# Patient Record
Sex: MALE | Race: WHITE | NOT HISPANIC OR LATINO
[De-identification: names, ages, dates, MRNs, and addresses within clinical notes are randomized per-mention and may not be internally consistent; named-entity substitution may affect disease eponyms.]

---

## 2017-08-16 ENCOUNTER — APPOINTMENT (OUTPATIENT)
Dept: SURGERY | Facility: CLINIC | Age: 77
End: 2017-08-16

## 2017-08-22 ENCOUNTER — APPOINTMENT (OUTPATIENT)
Dept: PULMONOLOGY | Facility: CLINIC | Age: 77
End: 2017-08-22
Payer: MEDICARE

## 2017-08-22 VITALS
WEIGHT: 180 LBS | OXYGEN SATURATION: 97 % | HEART RATE: 73 BPM | DIASTOLIC BLOOD PRESSURE: 90 MMHG | TEMPERATURE: 98.3 F | BODY MASS INDEX: 26.66 KG/M2 | SYSTOLIC BLOOD PRESSURE: 138 MMHG | HEIGHT: 69 IN

## 2017-08-22 PROCEDURE — 99214 OFFICE O/P EST MOD 30 MIN: CPT | Mod: 25

## 2017-08-22 PROCEDURE — 94010 BREATHING CAPACITY TEST: CPT

## 2018-03-20 ENCOUNTER — APPOINTMENT (OUTPATIENT)
Dept: PULMONOLOGY | Facility: CLINIC | Age: 78
End: 2018-03-20
Payer: MEDICARE

## 2018-03-20 VITALS
SYSTOLIC BLOOD PRESSURE: 142 MMHG | DIASTOLIC BLOOD PRESSURE: 80 MMHG | OXYGEN SATURATION: 97 % | BODY MASS INDEX: 26.66 KG/M2 | HEIGHT: 69 IN | WEIGHT: 180 LBS | HEART RATE: 72 BPM | TEMPERATURE: 98 F

## 2018-03-20 PROCEDURE — 94010 BREATHING CAPACITY TEST: CPT

## 2018-03-20 PROCEDURE — 99214 OFFICE O/P EST MOD 30 MIN: CPT | Mod: 25

## 2018-03-20 PROCEDURE — 71046 X-RAY EXAM CHEST 2 VIEWS: CPT

## 2018-12-03 ENCOUNTER — APPOINTMENT (OUTPATIENT)
Dept: CARDIOTHORACIC SURGERY | Facility: CLINIC | Age: 78
End: 2018-12-03
Payer: MEDICARE

## 2018-12-03 VITALS
TEMPERATURE: 97.4 F | BODY MASS INDEX: 23.4 KG/M2 | RESPIRATION RATE: 18 BRPM | HEIGHT: 69 IN | HEART RATE: 64 BPM | SYSTOLIC BLOOD PRESSURE: 155 MMHG | WEIGHT: 158 LBS | DIASTOLIC BLOOD PRESSURE: 83 MMHG | OXYGEN SATURATION: 97 %

## 2018-12-03 PROCEDURE — 99204 OFFICE O/P NEW MOD 45 MIN: CPT

## 2018-12-05 NOTE — PHYSICAL EXAM
[General Appearance - In No Acute Distress] : no acute distress [Normal Conjunctiva] : the conjunctiva exhibited no abnormalities [Normal Oral Mucosa] : normal oral mucosa [Normal Jugular Venous A Waves Present] : normal jugular venous A waves present [Normal Jugular Venous V Waves Present] : normal jugular venous V waves present [Heart Rate And Rhythm] : heart rate and rhythm were normal [Heart Sounds] : normal S1 and S2 [Edema] : no peripheral edema present [Respiration, Rhythm And Depth] : normal respiratory rhythm and effort [Exaggerated Use Of Accessory Muscles For Inspiration] : no accessory muscle use [Auscultation Breath Sounds / Voice Sounds] : lungs were clear to auscultation bilaterally [Bowel Sounds] : normal bowel sounds [Abdomen Soft] : soft [Abdomen Tenderness] : non-tender [Abnormal Walk] : normal gait [Cyanosis, Localized] : no localized cyanosis [] : no rash [Oriented To Time, Place, And Person] : oriented to person, place, and time [FreeTextEntry1] : + III/VI systolic ejection murmur

## 2018-12-05 NOTE — HISTORY OF PRESENT ILLNESS
[FreeTextEntry1] : 78 year old male with a history of hypertension, hyperlipidemia, GERD, Chronic diastolic heart failure with severe aortic stenosis who was referred for further evaluation of his valvular disease. \par \par The patient states he feels great without any complaints and leads an active lifestyle.  He plays tennis multiple times a week without limitations and does slow jogging for up to half a mile a day. He denies any chest pain, SOB at rest or with exertion, palpitations, dizziness, syncope or LE edema. \par \par The patient had an outpatient ECHO with Dr. Baeza on 11/13/18 which showed severe aortic stenosis with mean gradient 47.6 mmHg. \par \par The patient lives with his wife in an apartment and remains independent in all his ADLs.

## 2019-03-18 ENCOUNTER — APPOINTMENT (OUTPATIENT)
Dept: PULMONOLOGY | Facility: CLINIC | Age: 79
End: 2019-03-18
Payer: MEDICARE

## 2019-03-18 VITALS
HEART RATE: 58 BPM | WEIGHT: 160 LBS | TEMPERATURE: 97 F | SYSTOLIC BLOOD PRESSURE: 124 MMHG | DIASTOLIC BLOOD PRESSURE: 82 MMHG | BODY MASS INDEX: 23.7 KG/M2 | HEIGHT: 69 IN | OXYGEN SATURATION: 99 %

## 2019-03-18 PROCEDURE — 94010 BREATHING CAPACITY TEST: CPT

## 2019-03-18 PROCEDURE — 71046 X-RAY EXAM CHEST 2 VIEWS: CPT

## 2019-03-18 PROCEDURE — 99214 OFFICE O/P EST MOD 30 MIN: CPT | Mod: 25

## 2019-03-18 NOTE — DISCUSSION/SUMMARY
[FreeTextEntry1] : he certainly does not behave like a person with ciritical AS\par \par would suggest repeat echo in different lab to see if the results of concordant with dr. Baeza's\par

## 2019-03-18 NOTE — PHYSICAL EXAM
[General Appearance - Well Developed] : well developed [Normal Appearance] : normal appearance [Well Groomed] : well groomed [General Appearance - Well Nourished] : well nourished [No Deformities] : no deformities [General Appearance - In No Acute Distress] : no acute distress [Normal Conjunctiva] : the conjunctiva exhibited no abnormalities [Eyelids - No Xanthelasma] : the eyelids demonstrated no xanthelasmas [Normal Oropharynx] : normal oropharynx [Neck Appearance] : the appearance of the neck was normal [Neck Cervical Mass (___cm)] : no neck mass was observed [Jugular Venous Distention Increased] : there was no jugular-venous distention [Thyroid Diffuse Enlargement] : the thyroid was not enlarged [Thyroid Nodule] : there were no palpable thyroid nodules [Heart Rate And Rhythm] : heart rate and rhythm were normal [Heart Sounds] : normal S1 and S2 [Respiration, Rhythm And Depth] : normal respiratory rhythm and effort [Exaggerated Use Of Accessory Muscles For Inspiration] : no accessory muscle use [Auscultation Breath Sounds / Voice Sounds] : lungs were clear to auscultation bilaterally [Gait - Sufficient For Exercise Testing] : the gait was sufficient for exercise testing [Abnormal Walk] : normal gait [Nail Clubbing] : no clubbing of the fingernails [Cyanosis, Localized] : no localized cyanosis [Petechial Hemorrhages (___cm)] : no petechial hemorrhages [] : no ischemic changes [FreeTextEntry1] : hypochondria

## 2019-03-18 NOTE — PHYSICAL EXAM
[General Appearance - Well Developed] : well developed [Normal Appearance] : normal appearance [Well Groomed] : well groomed [General Appearance - Well Nourished] : well nourished [No Deformities] : no deformities [General Appearance - In No Acute Distress] : no acute distress [Normal Conjunctiva] : the conjunctiva exhibited no abnormalities [Eyelids - No Xanthelasma] : the eyelids demonstrated no xanthelasmas [Normal Oropharynx] : normal oropharynx [Neck Appearance] : the appearance of the neck was normal [Neck Cervical Mass (___cm)] : no neck mass was observed [Jugular Venous Distention Increased] : there was no jugular-venous distention [Thyroid Diffuse Enlargement] : the thyroid was not enlarged [Thyroid Nodule] : there were no palpable thyroid nodules [Heart Rate And Rhythm] : heart rate and rhythm were normal [Heart Sounds] : normal S1 and S2 [Respiration, Rhythm And Depth] : normal respiratory rhythm and effort [Exaggerated Use Of Accessory Muscles For Inspiration] : no accessory muscle use [Auscultation Breath Sounds / Voice Sounds] : lungs were clear to auscultation bilaterally [Abnormal Walk] : normal gait [Gait - Sufficient For Exercise Testing] : the gait was sufficient for exercise testing [Nail Clubbing] : no clubbing of the fingernails [Cyanosis, Localized] : no localized cyanosis [Petechial Hemorrhages (___cm)] : no petechial hemorrhages [] : no ischemic changes [FreeTextEntry1] : hypochondria

## 2019-03-18 NOTE — HISTORY OF PRESENT ILLNESS
[FreeTextEntry1] : 79 year old man who looks 15 years younger who is basically quite healthy,  i've seen him for ordinary resp infection that he was concerned about , but were just needing more time.  now told of severa AS according to echo, but he has totally asymptomatc, and it is odd that the gradient that was measured was as high as it was found to be.  he is able to play vigorous tennis with no issues.\par \par saw dr. adams who did not repeat the echo but said that no intervention was required.

## 2019-09-27 ENCOUNTER — APPOINTMENT (OUTPATIENT)
Dept: PULMONOLOGY | Facility: CLINIC | Age: 79
End: 2019-09-27
Payer: MEDICARE

## 2019-09-27 VITALS
DIASTOLIC BLOOD PRESSURE: 80 MMHG | BODY MASS INDEX: 23.7 KG/M2 | HEART RATE: 60 BPM | WEIGHT: 160 LBS | TEMPERATURE: 97.1 F | SYSTOLIC BLOOD PRESSURE: 100 MMHG | OXYGEN SATURATION: 97 % | HEIGHT: 69 IN

## 2019-09-27 PROCEDURE — 71046 X-RAY EXAM CHEST 2 VIEWS: CPT

## 2019-09-27 PROCEDURE — 99214 OFFICE O/P EST MOD 30 MIN: CPT | Mod: 25

## 2019-09-27 PROCEDURE — 94010 BREATHING CAPACITY TEST: CPT

## 2019-09-27 PROCEDURE — 95012 NITRIC OXIDE EXP GAS DETER: CPT

## 2019-09-28 NOTE — HISTORY OF PRESENT ILLNESS
[FreeTextEntry1] : status post tavr and has a protracted cough, and also on crestor because of a 50% blockage of the lad,  interestingly he never had much in the say of symptoms.  is on plavix   cougning for the past few months.  ther was a bit of blood in it it,  He has a histoiry of reflux but only take ranitidine intermittently.  this is his likely reason for coughing if I fail to find an althernativer reason

## 2019-09-28 NOTE — PHYSICAL EXAM
[General Appearance - Well Developed] : well developed [Normal Appearance] : normal appearance [Well Groomed] : well groomed [General Appearance - Well Nourished] : well nourished [No Deformities] : no deformities [General Appearance - In No Acute Distress] : no acute distress [Normal Conjunctiva] : the conjunctiva exhibited no abnormalities [Eyelids - No Xanthelasma] : the eyelids demonstrated no xanthelasmas [Normal Oropharynx] : normal oropharynx [Neck Appearance] : the appearance of the neck was normal [Neck Cervical Mass (___cm)] : no neck mass was observed [Jugular Venous Distention Increased] : there was no jugular-venous distention [Thyroid Diffuse Enlargement] : the thyroid was not enlarged [Heart Rate And Rhythm] : heart rate and rhythm were normal [Thyroid Nodule] : there were no palpable thyroid nodules [Heart Sounds] : normal S1 and S2 [Respiration, Rhythm And Depth] : normal respiratory rhythm and effort [Exaggerated Use Of Accessory Muscles For Inspiration] : no accessory muscle use [Auscultation Breath Sounds / Voice Sounds] : lungs were clear to auscultation bilaterally [Abnormal Walk] : normal gait [Gait - Sufficient For Exercise Testing] : the gait was sufficient for exercise testing [Cyanosis, Localized] : no localized cyanosis [Nail Clubbing] : no clubbing of the fingernails [] : no ischemic changes [Petechial Hemorrhages (___cm)] : no petechial hemorrhages [FreeTextEntry1] : hypochondria

## 2019-09-28 NOTE — DISCUSSION/SUMMARY
[FreeTextEntry1] : rule out reflux related cough\par \par return to ranitidine twice a day\par \par

## 2019-09-28 NOTE — REVIEW OF SYSTEMS
[Cough] : cough [Sputum] : sputum  [Anxiety] : anxiety [Dyspnea] : no dyspnea [Negative] : Sleep Disorder

## 2020-01-23 ENCOUNTER — APPOINTMENT (OUTPATIENT)
Dept: UROLOGY | Facility: CLINIC | Age: 80
End: 2020-01-23
Payer: MEDICARE

## 2020-01-23 VITALS — DIASTOLIC BLOOD PRESSURE: 97 MMHG | TEMPERATURE: 97.3 F | SYSTOLIC BLOOD PRESSURE: 152 MMHG | HEART RATE: 71 BPM

## 2020-01-23 PROCEDURE — 99204 OFFICE O/P NEW MOD 45 MIN: CPT

## 2020-01-27 LAB
APPEARANCE: CLEAR
BACTERIA UR CULT: NORMAL
BACTERIA: NEGATIVE
BILIRUBIN URINE: NEGATIVE
BLOOD URINE: ABNORMAL
COLOR: NORMAL
GLUCOSE QUALITATIVE U: NEGATIVE
HYALINE CASTS: 1 /LPF
KETONES URINE: NEGATIVE
LEUKOCYTE ESTERASE URINE: NEGATIVE
MICROSCOPIC-UA: NORMAL
NITRITE URINE: NEGATIVE
PH URINE: 5.5
PROTEIN URINE: NEGATIVE
RED BLOOD CELLS URINE: 4 /HPF
SPECIFIC GRAVITY URINE: 1.01
SQUAMOUS EPITHELIAL CELLS: 0 /HPF
UROBILINOGEN URINE: NORMAL
WHITE BLOOD CELLS URINE: 0 /HPF

## 2020-03-25 ENCOUNTER — APPOINTMENT (OUTPATIENT)
Dept: ULTRASOUND IMAGING | Facility: HOSPITAL | Age: 80
End: 2020-03-25

## 2020-03-31 ENCOUNTER — APPOINTMENT (OUTPATIENT)
Dept: UROLOGY | Facility: CLINIC | Age: 80
End: 2020-03-31

## 2020-04-24 ENCOUNTER — APPOINTMENT (OUTPATIENT)
Dept: UROLOGY | Facility: CLINIC | Age: 80
End: 2020-04-24

## 2020-09-21 DIAGNOSIS — N52.01 ERECTILE DYSFUNCTION DUE TO ARTERIAL INSUFFICIENCY: ICD-10-CM

## 2020-09-29 ENCOUNTER — APPOINTMENT (OUTPATIENT)
Dept: UROLOGY | Facility: CLINIC | Age: 80
End: 2020-09-29
Payer: MEDICARE

## 2020-09-29 VITALS — HEART RATE: 61 BPM | SYSTOLIC BLOOD PRESSURE: 143 MMHG | DIASTOLIC BLOOD PRESSURE: 88 MMHG

## 2020-09-29 DIAGNOSIS — N50.89 OTHER SPECIFIED DISORDERS OF THE MALE GENITAL ORGANS: ICD-10-CM

## 2020-09-29 DIAGNOSIS — N40.0 BENIGN PROSTATIC HYPERPLASIA WITHOUT LOWER URINARY TRACT SYMPMS: ICD-10-CM

## 2020-09-29 DIAGNOSIS — R31.21 ASYMPTOMATIC MICROSCOPIC HEMATURIA: ICD-10-CM

## 2020-09-29 PROCEDURE — 99214 OFFICE O/P EST MOD 30 MIN: CPT | Mod: 25

## 2020-09-29 PROCEDURE — 76705 ECHO EXAM OF ABDOMEN: CPT

## 2020-10-01 ENCOUNTER — APPOINTMENT (OUTPATIENT)
Dept: ULTRASOUND IMAGING | Facility: HOSPITAL | Age: 80
End: 2020-10-01

## 2020-10-01 LAB
APPEARANCE: CLEAR
BACTERIA UR CULT: NORMAL
BACTERIA: NEGATIVE
BILIRUBIN URINE: NEGATIVE
BLOOD URINE: NEGATIVE
COLOR: NORMAL
GLUCOSE QUALITATIVE U: NEGATIVE
HYALINE CASTS: 0 /LPF
KETONES URINE: NEGATIVE
LEUKOCYTE ESTERASE URINE: NEGATIVE
MICROSCOPIC-UA: NORMAL
NITRITE URINE: NEGATIVE
PH URINE: 6.5
PROTEIN URINE: NEGATIVE
RED BLOOD CELLS URINE: 0 /HPF
SPECIFIC GRAVITY URINE: 1.01
SQUAMOUS EPITHELIAL CELLS: 0 /HPF
UROBILINOGEN URINE: NORMAL
WHITE BLOOD CELLS URINE: 0 /HPF

## 2020-10-01 NOTE — HISTORY OF PRESENT ILLNESS
[FreeTextEntry1] : Dear Dr. Jun Kothari,\par Dear Dr. Georgia Baeza (PCP)\par \par Thank you so much for the referral to help care for your patient.\par \par Chief Complaint: AMH, BPH, ED annual follow up\par Date of first visit: 09/29/2020\par \par SHARON OROZCO  is a 80 year old  man, hx of BPH, AMH and ED, who presents for an annual visit. He was seen prior by Dr. Kothari on 01/23/2020. He was asked to follow up with Renal US, cystoscopy, Penile Doppler for ED. He is on Coumadin for Aortic Heart Valve.\par \par He had a prior work up in 2008, 2010 with CT scan, that was negative. Also he had Hematologist evaluate him. \par \par No prior gross hematuria. No CT Urogram scan not done. \par \par He reports generally having stable urinary symptoms with Nocturia x 2.\par \par 09/21/2020 \par IPSS 11 QOL 3 \par ELINA 2\par \par The patient denies fevers, chills, nausea and or vomiting and no unexplained weight loss.\par \par All pertinent laboratory, films and physician notes were reviewed.  Questionnaire results were discussed with patient.

## 2020-10-01 NOTE — ASSESSMENT
[FreeTextEntry1] : Work up for microhematuria, on Coumadin\par \par 1. UA UCX today\par 2. Renal US study today small renal cyst with a sepatation - recommend CT renal mass eval\par 3. Cystoscopy to complete work up\par 4. Scrotal Testicular US today or other day\par \par Thank you very much for allowing me to assist in the care of this patient. Should you have any additional questions or concerns please do not hesitate to contact me.\par \par Sincerely,\par \par Fernando Waite D.O.\par  of Urology and Radiology\par  of Urology at [facility]\par System Director for Prostate Cancer\par [address], [address]\par Phone: [phone]\par

## 2020-10-01 NOTE — PHYSICAL EXAM
[Urethral Meatus] : meatus normal [Penis Abnormality] : normal circumcised penis [Scrotum] : the scrotum was normal [Epididymis] : the epididymides were normal [Testes Tenderness] : no tenderness of the testes [Prostate Tenderness] : the prostate was not tender [No Prostate Nodules] : no prostate nodules [Prostate Size ___ gm] : prostate size [unfilled] gm [FreeTextEntry1] : Left scrotal cyst, likely hydrocele or epididymal head cyst

## 2021-03-01 ENCOUNTER — APPOINTMENT (OUTPATIENT)
Dept: PULMONOLOGY | Facility: CLINIC | Age: 81
End: 2021-03-01
Payer: MEDICARE

## 2021-03-01 VITALS
TEMPERATURE: 97.9 F | SYSTOLIC BLOOD PRESSURE: 124 MMHG | WEIGHT: 178 LBS | BODY MASS INDEX: 26.36 KG/M2 | DIASTOLIC BLOOD PRESSURE: 83 MMHG | HEIGHT: 69 IN | OXYGEN SATURATION: 97 % | HEART RATE: 63 BPM

## 2021-03-01 PROCEDURE — 94010 BREATHING CAPACITY TEST: CPT

## 2021-03-01 PROCEDURE — 99214 OFFICE O/P EST MOD 30 MIN: CPT | Mod: 25

## 2021-03-01 PROCEDURE — 95012 NITRIC OXIDE EXP GAS DETER: CPT

## 2021-03-02 NOTE — REVIEW OF SYSTEMS
[Cough] : cough [Chest Tightness] : no chest tightness [Sputum] : no sputum [Dyspnea] : no dyspnea [Negative] : Endocrine

## 2021-03-02 NOTE — HISTORY OF PRESENT ILLNESS
[TextBox_4] : 81 years of age, feels well, saw karie jones- told him had reflux.  cough not improved with reflux diagnosis and treatment\par here with exhaustive history, much not relevant to the problem at hand\par had aortic valve replacement, being followed at [facility] for this.\par \par his cough which in the past seemeed intermittedn is now chronic and non productive,  doees appear upper airway\par \par has qualitied of neurogenic cough, talking laughing blowing on the spirometry seems to induce it\par \par but cold makes it better?\par \par

## 2021-03-02 NOTE — DISCUSSION/SUMMARY
[FreeTextEntry1] : he mostly wants to be sure that the cough does not indicate something serious\par \par however I spoke to him at length about the diferential diagnosis of cough\par \par and what a formal work up would consist up.\par \par i explained ot him that to know that the cough does not indicate a serious condition that a ct of the chest would be needed.

## 2021-04-01 DIAGNOSIS — Z87.09 PERSONAL HISTORY OF OTHER DISEASES OF THE RESPIRATORY SYSTEM: ICD-10-CM

## 2021-04-01 DIAGNOSIS — H92.20 OTORRHAGIA, UNSPECIFIED EAR: ICD-10-CM

## 2021-04-01 DIAGNOSIS — J02.9 ACUTE PHARYNGITIS, UNSPECIFIED: ICD-10-CM

## 2021-04-01 DIAGNOSIS — R09.89 OTHER FORMS OF DYSPNEA: ICD-10-CM

## 2021-04-01 DIAGNOSIS — Z86.69 PERSONAL HISTORY OF OTHER DISEASES OF THE NERVOUS SYSTEM AND SENSE ORGANS: ICD-10-CM

## 2021-04-01 DIAGNOSIS — R06.09 OTHER FORMS OF DYSPNEA: ICD-10-CM

## 2021-04-01 DIAGNOSIS — F41.9 ANXIETY DISORDER, UNSPECIFIED: ICD-10-CM

## 2021-04-05 ENCOUNTER — NON-APPOINTMENT (OUTPATIENT)
Age: 81
End: 2021-04-05

## 2021-04-06 ENCOUNTER — APPOINTMENT (OUTPATIENT)
Dept: HEART AND VASCULAR | Facility: CLINIC | Age: 81
End: 2021-04-06
Payer: MEDICARE

## 2021-04-06 ENCOUNTER — NON-APPOINTMENT (OUTPATIENT)
Age: 81
End: 2021-04-06

## 2021-04-06 ENCOUNTER — LABORATORY RESULT (OUTPATIENT)
Age: 81
End: 2021-04-06

## 2021-04-06 VITALS
HEIGHT: 69 IN | BODY MASS INDEX: 26.36 KG/M2 | HEART RATE: 68 BPM | TEMPERATURE: 98 F | DIASTOLIC BLOOD PRESSURE: 70 MMHG | WEIGHT: 178 LBS | SYSTOLIC BLOOD PRESSURE: 130 MMHG

## 2021-04-06 DIAGNOSIS — Z00.00 ENCOUNTER FOR GENERAL ADULT MEDICAL EXAMINATION W/OUT ABNORMAL FINDINGS: ICD-10-CM

## 2021-04-06 PROCEDURE — 99214 OFFICE O/P EST MOD 30 MIN: CPT | Mod: 25

## 2021-04-06 PROCEDURE — 93000 ELECTROCARDIOGRAM COMPLETE: CPT

## 2021-04-06 PROCEDURE — 36415 COLL VENOUS BLD VENIPUNCTURE: CPT

## 2021-04-07 LAB
ALBUMIN SERPL ELPH-MCNC: 4.4 G/DL
ALP BLD-CCNC: 53 U/L
ALT SERPL-CCNC: 16 U/L
ANION GAP SERPL CALC-SCNC: 13 MMOL/L
AST SERPL-CCNC: 24 U/L
BASOPHILS # BLD AUTO: 0.04 K/UL
BASOPHILS NFR BLD AUTO: 0.8 %
BILIRUB SERPL-MCNC: 0.6 MG/DL
BUN SERPL-MCNC: 28 MG/DL
CALCIUM SERPL-MCNC: 9.5 MG/DL
CHLORIDE SERPL-SCNC: 104 MMOL/L
CHOLEST SERPL-MCNC: 166 MG/DL
CO2 SERPL-SCNC: 24 MMOL/L
CREAT SERPL-MCNC: 1.52 MG/DL
EOSINOPHIL # BLD AUTO: 0.03 K/UL
EOSINOPHIL NFR BLD AUTO: 0.6 %
ESTIMATED AVERAGE GLUCOSE: 114 MG/DL
GLUCOSE SERPL-MCNC: 90 MG/DL
HBA1C MFR BLD HPLC: 5.6 %
HCT VFR BLD CALC: 44.5 %
HDLC SERPL-MCNC: 71 MG/DL
HGB BLD-MCNC: 14.8 G/DL
IMM GRANULOCYTES NFR BLD AUTO: 0.2 %
INR PPP: 2.52 RATIO
LDLC SERPL CALC-MCNC: 81 MG/DL
LYMPHOCYTES # BLD AUTO: 1.62 K/UL
LYMPHOCYTES NFR BLD AUTO: 30.6 %
MAN DIFF?: NORMAL
MCHC RBC-ENTMCNC: 30.3 PG
MCHC RBC-ENTMCNC: 33.3 GM/DL
MCV RBC AUTO: 91 FL
MONOCYTES # BLD AUTO: 0.44 K/UL
MONOCYTES NFR BLD AUTO: 8.3 %
NEUTROPHILS # BLD AUTO: 3.15 K/UL
NEUTROPHILS NFR BLD AUTO: 59.5 %
NONHDLC SERPL-MCNC: 96 MG/DL
PLATELET # BLD AUTO: 142 K/UL
POTASSIUM SERPL-SCNC: 4.7 MMOL/L
PROT SERPL-MCNC: 6.7 G/DL
PT BLD: 28.7 SEC
RBC # BLD: 4.89 M/UL
RBC # FLD: 13.2 %
SODIUM SERPL-SCNC: 141 MMOL/L
TRIGL SERPL-MCNC: 75 MG/DL
TSH SERPL-ACNC: 5.18 UIU/ML
VIT B12 SERPL-MCNC: 294 PG/ML
WBC # FLD AUTO: 5.29 K/UL

## 2021-04-07 NOTE — ASSESSMENT
[FreeTextEntry1] : Patient with the following problems he had a aortic stenosis and received a TAVR at [facility] a few months later he had a severe gradient that was found due to a clot he has been on anticoagulation since October 2020 and he would like to stop discussed with Dr. Rowe and the doctor at [facility] there is a 50% recurrence rate I suggest seeing a hematologist and doing a coag work-up and then if that is normal stopping the Coumadin and repeating an echo within 1 month

## 2021-04-07 NOTE — HISTORY OF PRESENT ILLNESS
[FreeTextEntry1] : Patient is status post TAVR several months after TAVR I noted that he had a significant aortic valve gradient and a clot was seen and he was placed on anticoagulation

## 2021-04-07 NOTE — PHYSICAL EXAM
[General Appearance - Well Developed] : well developed [Normal Appearance] : normal appearance [Well Groomed] : well groomed [General Appearance - Well Nourished] : well nourished [No Deformities] : no deformities [General Appearance - In No Acute Distress] : no acute distress [Normal Conjunctiva] : the conjunctiva exhibited no abnormalities [Eyelids - No Xanthelasma] : the eyelids demonstrated no xanthelasmas [Normal Oral Mucosa] : normal oral mucosa [No Oral Pallor] : no oral pallor [No Oral Cyanosis] : no oral cyanosis [Normal Jugular Venous A Waves Present] : normal jugular venous A waves present [Normal Jugular Venous V Waves Present] : normal jugular venous V waves present [No Jugular Venous Gee A Waves] : no jugular venous gee A waves [Respiration, Rhythm And Depth] : normal respiratory rhythm and effort [Exaggerated Use Of Accessory Muscles For Inspiration] : no accessory muscle use [Auscultation Breath Sounds / Voice Sounds] : lungs were clear to auscultation bilaterally [Systolic grade ___/6] : A grade [unfilled]/6 systolic murmur was heard. [Abnormal Walk] : normal gait [Gait - Sufficient For Exercise Testing] : the gait was sufficient for exercise testing [Nail Clubbing] : no clubbing of the fingernails [Petechial Hemorrhages (___cm)] : no petechial hemorrhages [Cyanosis, Localized] : no localized cyanosis [] : no ischemic changes

## 2021-05-14 ENCOUNTER — APPOINTMENT (OUTPATIENT)
Dept: PULMONOLOGY | Facility: CLINIC | Age: 81
End: 2021-05-14
Payer: MEDICARE

## 2021-05-14 VITALS
DIASTOLIC BLOOD PRESSURE: 73 MMHG | SYSTOLIC BLOOD PRESSURE: 120 MMHG | OXYGEN SATURATION: 97 % | BODY MASS INDEX: 26.66 KG/M2 | HEIGHT: 69 IN | WEIGHT: 180 LBS | HEART RATE: 68 BPM | TEMPERATURE: 97.1 F

## 2021-05-14 PROCEDURE — 99213 OFFICE O/P EST LOW 20 MIN: CPT

## 2021-05-15 NOTE — HISTORY OF PRESENT ILLNESS
[TextBox_4] : patient with chronic low grade cough.\par \par primary doc got high resolution ct and mild bronchiectasis is seen.\par \par patient is very obsessive about every aspect of his health and we reviewed the ct scan extensively and i told hm that his cough could be explained by this\par \par i reviewed every reason a person can haver bronchiectasis\par \par he posed the question of how can an 85 year old person he knew "just die".\par \par clearly he thinks that being obsessive about his health in some fashion this will help him be healthier and prevent such an occurrence.\par \par reviewed this issues in great depth\par \par

## 2021-06-03 LAB
A1AT SERPL-MCNC: 162 MG/DL
ALBUMIN MFR SERPL ELPH: 59.9 %
ALBUMIN SERPL-MCNC: 4.2 G/DL
ALBUMIN/GLOB SERPL: 1.5 RATIO
ALPHA1 GLOB MFR SERPL ELPH: 4.6 %
ALPHA1 GLOB SERPL ELPH-MCNC: 0.3 G/DL
ALPHA2 GLOB MFR SERPL ELPH: 9.5 %
ALPHA2 GLOB SERPL ELPH-MCNC: 0.7 G/DL
B-GLOBULIN MFR SERPL ELPH: 12.8 %
B-GLOBULIN SERPL ELPH-MCNC: 0.9 G/DL
GAMMA GLOB FLD ELPH-MCNC: 0.9 G/DL
GAMMA GLOB MFR SERPL ELPH: 13.2 %
INTERPRETATION SERPL IEP-IMP: NORMAL
PROT SERPL-MCNC: 7 G/DL
PROT SERPL-MCNC: 7 G/DL

## 2021-06-24 ENCOUNTER — APPOINTMENT (OUTPATIENT)
Dept: HEART AND VASCULAR | Facility: CLINIC | Age: 81
End: 2021-06-24
Payer: MEDICARE

## 2021-06-24 VITALS
TEMPERATURE: 97 F | HEART RATE: 53 BPM | WEIGHT: 180 LBS | DIASTOLIC BLOOD PRESSURE: 83 MMHG | HEIGHT: 69 IN | SYSTOLIC BLOOD PRESSURE: 156 MMHG | BODY MASS INDEX: 26.66 KG/M2

## 2021-06-24 DIAGNOSIS — R91.1 SOLITARY PULMONARY NODULE: ICD-10-CM

## 2021-06-24 PROCEDURE — 99214 OFFICE O/P EST MOD 30 MIN: CPT | Mod: 25

## 2021-06-24 PROCEDURE — 93306 TTE W/DOPPLER COMPLETE: CPT

## 2021-06-29 PROBLEM — R91.1 LUNG NODULE SEEN ON IMAGING STUDY: Status: ACTIVE | Noted: 2021-06-29

## 2021-06-29 NOTE — PHYSICAL EXAM
[Murmur] : murmur [Normal] : no edema, no cyanosis, no clubbing, no varicosities [de-identified] : 2/6 rhea gee

## 2021-06-29 NOTE — ASSESSMENT
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 Rowe there is a 50% chance of rethrombosis patient was seen by Dr. Judson Bah of hematology there is no underlying coagulation issues patient is aware that there is a small chance of a stroke if we go off anticoagulation if we remain on anticoagulation of course there bleeding issues patient did bleed in his ear on probing with a Q-tip and feels that there has been some hearing loss there unclear to me if it was related to bleeding from the outer ear from manual manipulation patient is to think about whether he wants to go off anticoagulation or not

## 2021-06-29 NOTE — HISTORY OF PRESENT ILLNESS
[FreeTextEntry1] : Patient had a TAVR for severe aortic stenosis and shortness of breath post TAVR patient's gradients were climbing and he had moderate to severe aortic stenosis and a clot was found on the valve patient has been anticoagulated for over a year patient is wishes to go off anticoagulation and is here to discuss that issue

## 2021-08-26 ENCOUNTER — APPOINTMENT (OUTPATIENT)
Dept: HEART AND VASCULAR | Facility: CLINIC | Age: 81
End: 2021-08-26
Payer: MEDICARE

## 2021-08-26 VITALS
HEART RATE: 78 BPM | OXYGEN SATURATION: 97 % | BODY MASS INDEX: 25.48 KG/M2 | WEIGHT: 172 LBS | HEIGHT: 69 IN | DIASTOLIC BLOOD PRESSURE: 80 MMHG | SYSTOLIC BLOOD PRESSURE: 126 MMHG | TEMPERATURE: 97.7 F

## 2021-08-26 PROCEDURE — 93306 TTE W/DOPPLER COMPLETE: CPT

## 2021-08-26 PROCEDURE — 99215 OFFICE O/P EST HI 40 MIN: CPT | Mod: 25

## 2021-08-26 NOTE — ASSESSMENT
[FreeTextEntry1] : Patient's gradient has gone up from a peak of 23 24-69 with a mean gradient of 43 patient will restart Coumadin and continue a baby aspirin he will start 5 mg a day and repeat PT and INR on Monday patient had a recent MRI of the brain that showed no infarcts though he is having headaches and is seeing Dr. Brwon for that case discussed again with Dr. Monte from [facility] who placed the aortic valve he agrees with the management he feels no need for repeat CT scan now and as long as the patient is asymptomatic we will anticoagulate and repeat echocardiograms at intervals patient is aware of the above and will get an INR on Monday after 4 days of Coumadin

## 2021-08-26 NOTE — PHYSICAL EXAM
[Murmur] : murmur [Normal] : no edema, no cyanosis, no clubbing, no varicosities [de-identified] : 2/6 rhea gee

## 2021-08-26 NOTE — REASON FOR VISIT
[FreeTextEntry1] : Patient is here for follow-up he stopped his Coumadin approximately 5 weeks ago on July 20 after discussion of the possibility of the valve reclotting with both myself and Dr. Monte at [facility] patient had had a coagulation work-up by Dr. Judson Bah patient is asymptomatic

## 2021-08-27 ENCOUNTER — APPOINTMENT (OUTPATIENT)
Dept: HEART AND VASCULAR | Facility: CLINIC | Age: 81
End: 2021-08-27
Payer: MEDICARE

## 2021-08-27 ENCOUNTER — APPOINTMENT (OUTPATIENT)
Dept: CARDIOTHORACIC SURGERY | Facility: CLINIC | Age: 81
End: 2021-08-27
Payer: MEDICARE

## 2021-08-27 VITALS — HEART RATE: 61 BPM | TEMPERATURE: 98 F

## 2021-08-27 VITALS
DIASTOLIC BLOOD PRESSURE: 77 MMHG | OXYGEN SATURATION: 95 % | HEART RATE: 61 BPM | SYSTOLIC BLOOD PRESSURE: 144 MMHG | WEIGHT: 170 LBS | RESPIRATION RATE: 17 BRPM | TEMPERATURE: 97.2 F | BODY MASS INDEX: 25.18 KG/M2 | HEIGHT: 69 IN

## 2021-08-27 PROCEDURE — 99213 OFFICE O/P EST LOW 20 MIN: CPT

## 2021-08-27 PROCEDURE — 93000 ELECTROCARDIOGRAM COMPLETE: CPT

## 2021-08-27 PROCEDURE — 93000 ELECTROCARDIOGRAM COMPLETE: CPT | Mod: 77

## 2021-08-30 ENCOUNTER — NON-APPOINTMENT (OUTPATIENT)
Age: 81
End: 2021-08-30

## 2021-08-30 ENCOUNTER — APPOINTMENT (OUTPATIENT)
Dept: HEART AND VASCULAR | Facility: CLINIC | Age: 81
End: 2021-08-30
Payer: MEDICARE

## 2021-08-30 VITALS
BODY MASS INDEX: 25.18 KG/M2 | HEIGHT: 69 IN | DIASTOLIC BLOOD PRESSURE: 73 MMHG | SYSTOLIC BLOOD PRESSURE: 143 MMHG | HEART RATE: 59 BPM | WEIGHT: 170 LBS

## 2021-08-30 PROCEDURE — 99204 OFFICE O/P NEW MOD 45 MIN: CPT

## 2021-08-30 PROCEDURE — 93000 ELECTROCARDIOGRAM COMPLETE: CPT

## 2021-08-31 NOTE — HISTORY OF PRESENT ILLNESS
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 \par \par The patient underwent an uncomplicated TAVR in April 2019. In October 2019, the patient was noted to have an elevated aortic valve gradient on ECHO and subsequent evaluation showed leaflet thrombosis. Of note, he was asymptomatic at the time. He was started on Coumadin and the gradient improved. In July, the patient wished to get off the Coumadin. He was sent to Dr. Bah for a hypercoagulable workup that was negative. The patient discontinued Coumadin on July 20th and a repeat ECHO done last week showed an increase in the aortic gradient again with a mean gradient of 43 mmHg. He resumed Coumadin last night. \par \par In general, the patient states he is feeling well. He denies chest pain and SOB, at rest and with exertion. The patient also denies orthopnea, PND, dizziness, syncope, LE edema and palpitations. \par \par The patient lives with his wife in an apartment and remains independent in all his ADLs.

## 2021-08-31 NOTE — DISCUSSION/SUMMARY
[FreeTextEntry1] : Mr. 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 Baeza 8/2021.  He has no high risk symptoms.  I recommended that he undergo ambulatory telemetry for heart rhythm assessment to evaluate for any evidence of worsening conduction disease / AV block.  He was asked to return for follow-up in 6 weeks and knows to call with any questions or concerns in the interim.

## 2021-08-31 NOTE — HISTORY OF PRESENT ILLNESS
[FreeTextEntry1] : Mr. Oliva is an 81 year old male with a past medical history significant for HTN, HLD, GERD, severe AS s/p TAVR (Chepe @ [facility] 4/2019).  In October 2019, the patient was noted to have an elevated aortic valve gradient on ECHO and subsequent evaluation showed leaflet thrombosis. He was started on Coumadin and the gradient improved. In July, the patient wished to get off the Coumadin. He was sent to Dr. Bah for a hypercoagulable workup that was negative. The patient discontinued Coumadin and a repeat ECHO showed an increase in the aortic gradient again with a mean gradient of 43 mmHg. He resumed Coumadin thereafter.  He is referred for EP evaluation of a new RBBB noted on ECG with Dr. Baeza.  He feels well and offers no acute complaints.  He denies any dizziness, presyncope/syncope.  \par \par

## 2021-08-31 NOTE — PHYSICAL EXAM
[General Appearance - In No Acute Distress] : no acute distress [Normal Conjunctiva] : the conjunctiva exhibited no abnormalities [Normal Oral Mucosa] : normal oral mucosa [Normal Jugular Venous A Waves Present] : normal jugular venous A waves present [Normal Jugular Venous V Waves Present] : normal jugular venous V waves present [Heart Rate And Rhythm] : heart rate and rhythm were normal [Edema] : no peripheral edema present [Respiration, Rhythm And Depth] : normal respiratory rhythm and effort [Exaggerated Use Of Accessory Muscles For Inspiration] : no accessory muscle use [Auscultation Breath Sounds / Voice Sounds] : lungs were clear to auscultation bilaterally [Bowel Sounds] : normal bowel sounds [Abdomen Soft] : soft [Abdomen Tenderness] : non-tender [Cyanosis, Localized] : no localized cyanosis [Abnormal Walk] : normal gait [] : no rash [Oriented To Time, Place, And Person] : oriented to person, place, and time [Normal Appearance] : normal appearance [FreeTextEntry1] : Normal S1, diminished S2. RICHELLE best heard at the [facility].

## 2021-09-10 ENCOUNTER — APPOINTMENT (OUTPATIENT)
Dept: UROLOGY | Facility: CLINIC | Age: 81
End: 2021-09-10
Payer: MEDICARE

## 2021-09-10 ENCOUNTER — NON-APPOINTMENT (OUTPATIENT)
Age: 81
End: 2021-09-10

## 2021-09-10 VITALS
WEIGHT: 170 LBS | HEART RATE: 67 BPM | TEMPERATURE: 97.4 F | SYSTOLIC BLOOD PRESSURE: 123 MMHG | HEIGHT: 69 IN | DIASTOLIC BLOOD PRESSURE: 75 MMHG | BODY MASS INDEX: 25.18 KG/M2

## 2021-09-10 DIAGNOSIS — N50.812 LEFT TESTICULAR PAIN: ICD-10-CM

## 2021-09-10 PROCEDURE — 99214 OFFICE O/P EST MOD 30 MIN: CPT | Mod: 25

## 2021-09-10 PROCEDURE — 76870 US EXAM SCROTUM: CPT

## 2021-09-10 PROCEDURE — 93975 VASCULAR STUDY: CPT

## 2021-09-10 NOTE — HISTORY OF PRESENT ILLNESS
[FreeTextEntry1] : Mr. Oliva is here due to left testicular pain\par \par medical history of \par TAVR \par ON WARFARIN\par \par SURGICAL HISTORY OF BILATERAL INGUINAL HERNIA REPAIR\par CT AND CYSTOSCOPY DONE YEARS AGO WERE NORMAL \par PSA normal (as per patient , results not available)

## 2021-09-10 NOTE — PHYSICAL EXAM
[Urethral Meatus] : meatus normal [Penis Abnormality] : normal circumcised penis [Anus Abnormality] : the anus and perineum were normal [Rectal Exam - Rectum] : digital rectal exam was normal [Prostate Tenderness] : the prostate was not tender [No Prostate Nodules] : no prostate nodules [Prostate Enlarged] : was enlarged [Prostate Tenderness] : was not tender [Prostate Fluctuant] : was not fluctuant [FreeTextEntry1] : left testicular tenderness and hotness redness, suggestive of epididymo-orchitis

## 2021-09-10 NOTE — ASSESSMENT
[FreeTextEntry1] : left testis pain\par previous heistoyr microhematuria\par "I want you to focus only on this - the hematuria has been addressed"\par no previous cysto\par sonogram is equivocal for orchitis - unlikley\par large left epdidiymal cyst\par UA UCX today\par will start empiric keflex - if negative culture will stop\par if no infeciton f/u here PRN\par

## 2021-09-10 NOTE — PHYSICAL EXAM
[General Appearance - Well Developed] : well developed [General Appearance - Well Nourished] : well nourished [Normal Appearance] : normal appearance [Well Groomed] : well groomed [Heart Rate And Rhythm] : Heart rate and rhythm were normal [] : no respiratory distress [Abdomen Soft] : soft [Abdomen Tenderness] : non-tender [Abdomen Hernia] : no hernia was discovered [Costovertebral Angle Tenderness] : no ~M costovertebral angle tenderness [Urethral Meatus] : meatus normal [Urinary Bladder Findings] : the bladder was normal on palpation [Scrotum] : the scrotum was normal [Epididymis] : the epididymides were normal [Testes Tenderness] : no tenderness of the testes [Testes Mass (___cm)] : there were no testicular masses [Normal Station and Gait] : the gait and station were normal for the patient's age [Skin Color & Pigmentation] : normal skin color and pigmentation [No Focal Deficits] : no focal deficits [Oriented To Time, Place, And Person] : oriented to person, place, and time [No Palpable Adenopathy] : no palpable adenopathy [Penis Abnormality] : normal circumcised penis [Prostate Tenderness] : the prostate was not tender [No Prostate Nodules] : no prostate nodules

## 2021-09-10 NOTE — ASSESSMENT
[FreeTextEntry1] : UA, UCX\par PSA ahen the epididmoorchitis resolve\par antibiotics \par F/A 2 WEEKS

## 2021-09-10 NOTE — HISTORY OF PRESENT ILLNESS
[FreeTextEntry1] : Mr. Oliva is here for follow up hematuria 2020\par \par \par currently : no gross hematuria\par he has mild frequancy nocturia incomplete emptying\par \par father  of prostate cancer \par Medical history and medication \par \par aortic valve replacement  on warfarin\par Rouvstatin\par hydrochlorothiazide \par Baby Aspirine 81 mg\par HEARING PROBLEMS\par \par SURGICAL HISTORY \par Bilateral inguinal hernias repair \par TAVR \par \par HISTORY OF CYSTOSCOPY AND CT done many years and were normal \par \par left hemiscrotum is red , hot and tender suggestive of epididymo-orchitis?\par CHERRI : Enlarged prostate , benign , no nodules\par

## 2021-09-10 NOTE — PHYSICAL EXAM
[General Appearance - Well Developed] : well developed [General Appearance - Well Nourished] : well nourished [Normal Appearance] : normal appearance [Well Groomed] : well groomed [] : no respiratory distress [Heart Rate And Rhythm] : Heart rate and rhythm were normal [Abdomen Soft] : soft [Abdomen Hernia] : no hernia was discovered [Abdomen Tenderness] : non-tender [Costovertebral Angle Tenderness] : no ~M costovertebral angle tenderness [Urethral Meatus] : meatus normal [Urinary Bladder Findings] : the bladder was normal on palpation [Scrotum] : the scrotum was normal [Epididymis] : the epididymides were normal [Testes Tenderness] : no tenderness of the testes [Testes Mass (___cm)] : there were no testicular masses [Normal Station and Gait] : the gait and station were normal for the patient's age [Skin Color & Pigmentation] : normal skin color and pigmentation [No Focal Deficits] : no focal deficits [Oriented To Time, Place, And Person] : oriented to person, place, and time [No Palpable Adenopathy] : no palpable adenopathy [Penis Abnormality] : normal circumcised penis [Prostate Tenderness] : the prostate was not tender [No Prostate Nodules] : no prostate nodules

## 2021-09-13 LAB
APPEARANCE: CLEAR
BACTERIA UR CULT: NORMAL
BACTERIA: NEGATIVE
BILIRUBIN URINE: NEGATIVE
BLOOD URINE: NORMAL
COLOR: NORMAL
GLUCOSE QUALITATIVE U: NEGATIVE
HYALINE CASTS: 0 /LPF
KETONES URINE: NEGATIVE
LEUKOCYTE ESTERASE URINE: NEGATIVE
MICROSCOPIC-UA: NORMAL
NITRITE URINE: NEGATIVE
PH URINE: 6
PROTEIN URINE: NEGATIVE
RED BLOOD CELLS URINE: 1 /HPF
SPECIFIC GRAVITY URINE: 1.01
SQUAMOUS EPITHELIAL CELLS: 0 /HPF
UROBILINOGEN URINE: NORMAL
WHITE BLOOD CELLS URINE: 0 /HPF

## 2021-09-28 ENCOUNTER — APPOINTMENT (OUTPATIENT)
Dept: HEART AND VASCULAR | Facility: CLINIC | Age: 81
End: 2021-09-28
Payer: MEDICARE

## 2021-09-28 VITALS
BODY MASS INDEX: 25.18 KG/M2 | DIASTOLIC BLOOD PRESSURE: 80 MMHG | SYSTOLIC BLOOD PRESSURE: 128 MMHG | TEMPERATURE: 97.4 F | HEIGHT: 69 IN | HEART RATE: 57 BPM | WEIGHT: 170 LBS

## 2021-09-28 PROCEDURE — 99214 OFFICE O/P EST MOD 30 MIN: CPT | Mod: 25

## 2021-09-28 PROCEDURE — 36415 COLL VENOUS BLD VENIPUNCTURE: CPT

## 2021-09-28 PROCEDURE — 93306 TTE W/DOPPLER COMPLETE: CPT

## 2021-09-28 PROCEDURE — 93000 ELECTROCARDIOGRAM COMPLETE: CPT

## 2021-09-29 NOTE — HISTORY OF PRESENT ILLNESS
[FreeTextEntry1] :  Patient with history of TAVR patient's TAVR clotted off after 4 months patient was on anticoagulation for over a year patient wanted a trial of Coumadin and he reclotted with a peak gradient of 75 patient is here for follow-up patient has been anticoagulated for 6 weeks

## 2021-09-29 NOTE — ASSESSMENT
[FreeTextEntry1] : Patient's peak gradient has decreased from 75-30 patient remains asymptomatic patient has developed a right bundle branch block which is stable he is wearing a Zio patch and is being evaluated by EPS

## 2021-09-29 NOTE — PHYSICAL EXAM
[Murmur] : murmur [Normal] : no edema, no cyanosis, no clubbing, no varicosities [de-identified] : 2/6 rhea gee

## 2021-09-30 LAB
INR PPP: 3.28 RATIO
PT BLD: 36.6 SEC

## 2021-10-03 ENCOUNTER — APPOINTMENT (OUTPATIENT)
Dept: HEART AND VASCULAR | Facility: CLINIC | Age: 81
End: 2021-10-03
Payer: MEDICARE

## 2021-10-03 PROCEDURE — 93248 EXT ECG>7D<15D REV&INTERPJ: CPT

## 2021-11-09 ENCOUNTER — APPOINTMENT (OUTPATIENT)
Dept: HEART AND VASCULAR | Facility: CLINIC | Age: 81
End: 2021-11-09
Payer: MEDICARE

## 2021-11-09 ENCOUNTER — NON-APPOINTMENT (OUTPATIENT)
Age: 81
End: 2021-11-09

## 2021-11-09 VITALS
SYSTOLIC BLOOD PRESSURE: 130 MMHG | BODY MASS INDEX: 25.33 KG/M2 | HEIGHT: 69 IN | HEART RATE: 59 BPM | OXYGEN SATURATION: 97 % | DIASTOLIC BLOOD PRESSURE: 75 MMHG | TEMPERATURE: 97.5 F | WEIGHT: 171 LBS

## 2021-11-09 PROCEDURE — 99213 OFFICE O/P EST LOW 20 MIN: CPT | Mod: 25

## 2021-11-09 PROCEDURE — 93000 ELECTROCARDIOGRAM COMPLETE: CPT

## 2021-11-09 NOTE — PHYSICAL EXAM
[Murmur] : murmur [Normal] : no edema, no cyanosis, no clubbing, no varicosities [de-identified] : 2/6 rhea gee

## 2021-11-09 NOTE — ASSESSMENT
[FreeTextEntry1] : pt without new symptosmwill repeat echocardiogram in one month pt rbbb has not changed pt seen by dr jorge for testicular pain

## 2021-11-09 NOTE — REASON FOR VISIT
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

## 2021-12-07 ENCOUNTER — APPOINTMENT (OUTPATIENT)
Dept: HEART AND VASCULAR | Facility: CLINIC | Age: 81
End: 2021-12-07
Payer: MEDICARE

## 2021-12-07 ENCOUNTER — NON-APPOINTMENT (OUTPATIENT)
Age: 81
End: 2021-12-07

## 2021-12-07 VITALS
HEIGHT: 69 IN | TEMPERATURE: 97.6 F | DIASTOLIC BLOOD PRESSURE: 81 MMHG | WEIGHT: 172 LBS | BODY MASS INDEX: 25.48 KG/M2 | OXYGEN SATURATION: 96 % | SYSTOLIC BLOOD PRESSURE: 135 MMHG | HEART RATE: 55 BPM

## 2021-12-07 PROCEDURE — 93306 TTE W/DOPPLER COMPLETE: CPT

## 2021-12-07 PROCEDURE — 99213 OFFICE O/P EST LOW 20 MIN: CPT | Mod: 25

## 2021-12-07 PROCEDURE — 93000 ELECTROCARDIOGRAM COMPLETE: CPT

## 2021-12-09 NOTE — PHYSICAL EXAM
[Murmur] : murmur [Normal] : no edema, no cyanosis, no clubbing, no varicosities [de-identified] : 2/6 rhea gee

## 2021-12-09 NOTE — ASSESSMENT
[FreeTextEntry1] : gradient decreasing continue life long anticagulation pt has persistant rbbb appreciate eps input

## 2021-12-14 ENCOUNTER — RX RENEWAL (OUTPATIENT)
Age: 81
End: 2021-12-14

## 2022-03-17 ENCOUNTER — NON-APPOINTMENT (OUTPATIENT)
Age: 82
End: 2022-03-17

## 2022-03-17 ENCOUNTER — APPOINTMENT (OUTPATIENT)
Dept: HEART AND VASCULAR | Facility: CLINIC | Age: 82
End: 2022-03-17
Payer: MEDICARE

## 2022-03-17 VITALS
HEART RATE: 69 BPM | HEIGHT: 69 IN | DIASTOLIC BLOOD PRESSURE: 92 MMHG | WEIGHT: 172 LBS | TEMPERATURE: 97.3 F | OXYGEN SATURATION: 95 % | SYSTOLIC BLOOD PRESSURE: 133 MMHG | BODY MASS INDEX: 25.48 KG/M2

## 2022-03-17 PROCEDURE — 36415 COLL VENOUS BLD VENIPUNCTURE: CPT

## 2022-03-17 PROCEDURE — 99214 OFFICE O/P EST MOD 30 MIN: CPT | Mod: 25

## 2022-03-17 NOTE — ASSESSMENT
[FreeTextEntry1] : Patient status post TAVR acceptable gradient INRs have been therapeutic patient has known coronary disease in the LAD patient has a pancreatic cyst that was followed from 2008 through 2016 it is a simple cyst of approximately 2 cm patient had small lung lesions that have resolved on the last CAT scan of April 2021 patient has hypertension and hyperlipidemia

## 2022-03-17 NOTE — PHYSICAL EXAM
[Murmur] : murmur [Normal] : no edema, no cyanosis, no clubbing, no varicosities [de-identified] : 2/6 rhea gee

## 2022-03-18 LAB
ALBUMIN SERPL ELPH-MCNC: 4.3 G/DL
ALP BLD-CCNC: 52 U/L
ALT SERPL-CCNC: 18 U/L
ANION GAP SERPL CALC-SCNC: 14 MMOL/L
APTT BLD: 39 SEC
AST SERPL-CCNC: 33 U/L
BASOPHILS # BLD AUTO: 0.04 K/UL
BASOPHILS NFR BLD AUTO: 0.7 %
BILIRUB SERPL-MCNC: 0.8 MG/DL
BUN SERPL-MCNC: 25 MG/DL
CALCIUM SERPL-MCNC: 9.7 MG/DL
CHLORIDE SERPL-SCNC: 101 MMOL/L
CHOLEST SERPL-MCNC: 161 MG/DL
CK SERPL-CCNC: 374 U/L
CO2 SERPL-SCNC: 24 MMOL/L
CREAT SERPL-MCNC: 1.45 MG/DL
EGFR: 48 ML/MIN/1.73M2
EOSINOPHIL # BLD AUTO: 0.03 K/UL
EOSINOPHIL NFR BLD AUTO: 0.6 %
ESTIMATED AVERAGE GLUCOSE: 117 MG/DL
FERRITIN SERPL-MCNC: 99 NG/ML
GLUCOSE SERPL-MCNC: 94 MG/DL
HBA1C MFR BLD HPLC: 5.7 %
HCT VFR BLD CALC: 43.8 %
HDLC SERPL-MCNC: 78 MG/DL
HGB BLD-MCNC: 14.2 G/DL
IMM GRANULOCYTES NFR BLD AUTO: 0.2 %
INR PPP: 3.25 RATIO
LDLC SERPL CALC-MCNC: 70 MG/DL
LYMPHOCYTES # BLD AUTO: 1.56 K/UL
LYMPHOCYTES NFR BLD AUTO: 29.2 %
MAN DIFF?: NORMAL
MCHC RBC-ENTMCNC: 29.3 PG
MCHC RBC-ENTMCNC: 32.4 GM/DL
MCV RBC AUTO: 90.3 FL
MONOCYTES # BLD AUTO: 0.5 K/UL
MONOCYTES NFR BLD AUTO: 9.4 %
NEUTROPHILS # BLD AUTO: 3.2 K/UL
NEUTROPHILS NFR BLD AUTO: 59.9 %
NONHDLC SERPL-MCNC: 84 MG/DL
PLATELET # BLD AUTO: 139 K/UL
POTASSIUM SERPL-SCNC: 4.8 MMOL/L
PROT SERPL-MCNC: 6.6 G/DL
PT BLD: 38.5 SEC
RBC # BLD: 4.85 M/UL
RBC # FLD: 13 %
SODIUM SERPL-SCNC: 140 MMOL/L
TRIGL SERPL-MCNC: 70 MG/DL
TSH SERPL-ACNC: 3.92 UIU/ML
VIT B12 SERPL-MCNC: 267 PG/ML
WBC # FLD AUTO: 5.34 K/UL

## 2022-05-09 ENCOUNTER — APPOINTMENT (OUTPATIENT)
Dept: HEART AND VASCULAR | Facility: CLINIC | Age: 82
End: 2022-05-09

## 2022-05-14 ENCOUNTER — RX RENEWAL (OUTPATIENT)
Age: 82
End: 2022-05-14

## 2022-05-25 ENCOUNTER — APPOINTMENT (OUTPATIENT)
Dept: INFECTIOUS DISEASE | Facility: CLINIC | Age: 82
End: 2022-05-25
Payer: MEDICARE

## 2022-05-25 VITALS
OXYGEN SATURATION: 96 % | TEMPERATURE: 97.1 F | HEIGHT: 69 IN | SYSTOLIC BLOOD PRESSURE: 143 MMHG | WEIGHT: 174.13 LBS | DIASTOLIC BLOOD PRESSURE: 96 MMHG | HEART RATE: 64 BPM | BODY MASS INDEX: 25.79 KG/M2

## 2022-05-25 DIAGNOSIS — U07.1 COVID-19: ICD-10-CM

## 2022-05-25 PROCEDURE — 99204 OFFICE O/P NEW MOD 45 MIN: CPT | Mod: CS

## 2022-05-25 RX ORDER — CEPHALEXIN 500 MG/1
500 CAPSULE ORAL EVERY 8 HOURS
Qty: 21 | Refills: 0 | Status: COMPLETED | COMMUNITY
Start: 2021-09-10 | End: 2022-05-25

## 2022-05-25 NOTE — HISTORY OF PRESENT ILLNESS
[FreeTextEntry1] : 82 year old male with HLD, HTN, pmh HF with severe aortic stenosis s/p TAVR 04/2019 referred by Dr. Baeza for post COVID. He was traveling in [place] April 2022. Took PCR on 4/15 as pre-travel requirement which was positive. His wife also tested positive but was asymptomatic. They quarantined at a hotel and tested negative on 4/26. He continues to have headache, cough, feeling warm in the evening and malaise/brain fog. Overall symptoms are improving and he feels better than he did 1 week ago. He's resumed jogging and playing tennis.

## 2022-05-25 NOTE — PHYSICAL EXAM
[General Appearance - Alert] : alert [General Appearance - In No Acute Distress] : in no acute distress [Sclera] : the sclera and conjunctiva were normal [Outer Ear] : the ears and nose were normal in appearance [Neck Appearance] : the appearance of the neck was normal [Heart Rate And Rhythm] : heart rate was normal and rhythm regular [Edema] : there was no peripheral edema [Abdomen Soft] : soft [No Palpable Adenopathy] : no palpable adenopathy [Musculoskeletal - Swelling] : no joint swelling [] : no rash [Motor Exam] : the motor exam was normal [No Focal Deficits] : no focal deficits [Oriented To Time, Place, And Person] : oriented to person, place, and time

## 2022-07-06 PROBLEM — Z86.718 HISTORY OF BLOOD CLOTS: Status: RESOLVED | Noted: 2022-07-06 | Resolved: 2022-07-06

## 2022-07-11 ENCOUNTER — APPOINTMENT (OUTPATIENT)
Dept: SURGICAL ONCOLOGY | Facility: CLINIC | Age: 82
End: 2022-07-11

## 2022-07-11 VITALS
BODY MASS INDEX: 25.92 KG/M2 | RESPIRATION RATE: 16 BRPM | SYSTOLIC BLOOD PRESSURE: 133 MMHG | HEART RATE: 60 BPM | HEIGHT: 69 IN | WEIGHT: 175 LBS | DIASTOLIC BLOOD PRESSURE: 80 MMHG | OXYGEN SATURATION: 97 %

## 2022-07-11 DIAGNOSIS — Z86.718 PERSONAL HISTORY OF OTHER VENOUS THROMBOSIS AND EMBOLISM: ICD-10-CM

## 2022-07-11 PROCEDURE — 99204 OFFICE O/P NEW MOD 45 MIN: CPT

## 2022-07-11 NOTE — ASSESSMENT
[FreeTextEntry1] : I) Pancreas cyst\par \par P) Plan: Long discussion with patient about current clinical findings. 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 Has had this for many years based on imaging reports and his history although imaged intermittently and not on an annual basis. There are no high risk or worrisome features. Discussed the natural history of these lesions. In the setting of no high risk or worrisome features, risk of malignancy is less than 1% ([facility] data 2017). The likelihood of this becoming an issue is quite small.In reviewing an image in our system from 2016, I see the cyst was present and measured 2.0 by 1.7 cm (coronal x axial). We don’t have the 2020 image to review but in 2022 image at [facility] it looks same size. I will review the 2020 image when we get it but I suspect the measurements reported are axial (and not coronal) and in fact cyst has been stable. If so would reimage in 2 years and then stop if no changes given he will be 84 at that point. All questions answered. Arrangements will made as outlined.\par \par Everton Hernandes MD\par \par Chief Surgical Oncology\par Multidisciplinary GI cancer program\par [facility]\par [facility]\par \par Professor Surgery\par [employer]\par  \par \par cc Dr Baeza

## 2022-07-11 NOTE — PHYSICAL EXAM
[Normal Neck Lymph Nodes] : normal neck lymph nodes  [Normal Supraclavicular Lymph Nodes] : normal supraclavicular lymph nodes [Normal] : oriented to person, place and time, with appropriate affect [de-identified] : anicteric [de-identified] : hard of hearing [de-identified] : S1,S2, regular rate and rhythm. No murmurs heard. [de-identified] : Clear throughout. No wheezes heard. [de-identified] : .

## 2022-07-11 NOTE — REVIEW OF SYSTEMS
[Negative] : Neurological [FreeTextEntry4] : MILAN [FreeTextEntry5] : "occasional twinge in the area of the heart" followed for this by cardiologist [FreeTextEntry8] : as per HPI [de-identified] : increased stressors since eugenia COVID-19 [FreeTextEntry1] : hx bleeding on Warfarin

## 2022-07-11 NOTE — RESULTS/DATA
[FreeTextEntry1] : Diagnostic Studies\par \par Date: 6/24/22\par Study: MRCP WWO (R)\par Results: PANCREAS: 2.0 x 1.8 cm unilocular cyst in the uncinate process, previously 1.7 x 1.6 cm. Additional subcentimeter cysts measuring up to 0.5 cm in the body. Normal caliber duct. No peripancreatic abnormality. \par IMPRESSION: There is a 2.0 cm unilocular cyst in the pancreatic uncinate process, previously 1.7 cm in 2016. Normal caliber pancreatic duct. Additional subcentimeter pancreatic cysts. Follow up MRI can be obtained in one year to document stability. Hepatic and renal cysts. No imaging follow up is recommended.\par \par High Risk Stigmata:\par \par Obstructive jaundice                                                []    yes    [x]    no\par Enhancing mural nodule 5 mm or greater                 []    yes    [x]    no\par Main PD  > 10mm                                                      []    yes    [x]    no\par \par \par Worrisome features:\par \par Cyst size > 3 cm                                                 []    yes    [x]    no\par Enhancing mural nodule < 5 mm                         []    yes    [x]    no\par Thick/enhancing cyst wall                                  []    yes    [x]    no\par Main PD 5 to 9 mm                                               []    yes    [x]    no\par Change in PD caliber                                           []    yes    [x]    no\par Lymphadenopathy                                              []    yes    [x]    no\par Elevated Ca 19-9                                                []    yes    [x]    no\par Growth rate 5 mm or greater over 2 years        []    yes    []    no\par \par \par Date: 10/1/2020\par Study: CT AP with CT Urogram ([facility])\par Results: 1) No mass lesions or significant lymphadenopathy identified within the abdomen and pelvis\par 2) There is mild cortical irregularity of the left kidney. This is similar to that demonstrated previously. There are a few tiny renal cysts. The kidneys are otherwise unremarkable. There is mild diffuse thickening of the wall of the urinary bladder, with a few diverticula identified posteriorly bilaterally involving the urinary bladder. There is no evidence of a discrete mass lesion\par 3) There are cysts identified within the head and uncinate process of the pancreas. These are similar to that demonstrated previously. There is no evidence of a pancreatic mass (1.6 cm cyst in the HOP and a smaller 0.9 cm cystic structure in the uncinate process)\par 4) The patient is status post TAVR procedure\par 5) The patient is status post left inguinal hernia repair with a plug in place. There is no evidence of recurrent hernia\par 6) There are multiple diverticula within the sigmoid colon. There is no evidence of diverticulitis or diverticular abscess\par 7) There are a few hepatic cysts. These are similar to that demonstrated previously and are of doubtful clinically significance.\par \par \par Date: 7/23/15 \par Study: CT Abdomen with oral ([facility])\par Results: CT scan of abdomen was performed. Comparison is made with previous examinations, the last of which  was a CT abdomen performed on July 12, 2011. That examination had demonstrated a cystic structure within the head of the pancreas, unchanged in comparison with earlier examinations performed in June 2008. The examination demonstrates:\par 1) There continues to be a thin walled cystic structure in the region of the head of the pancreas. It is slightly enlarged in comparison with the previous examinations the last of which was performed July 2011. (1.55 x 1.7 cm in width)\par There are no other cysts or mass lesions within the remainder of the pancreas.\par 2) There are a few small renal cysts, as well as hepatic cysts. These are of doubtful clinical significance\par There is slight cortical irregularity in the posterolateral aspect of the mid right kidney. This represents postinflammatory or vascular change\par 3) There are no mass lesions or lymphadenopathy identified within the abdomen\par \par \par Date: 7/12/2011\par Study: CT AP W ([facility])\par Results: There is no significant change when compared to the prior study\par 1.3 cm cyst in the head of the pancreas is unchanged from the prior study\par Hepatic cyst\par Not mentioned in the body of the report is a tiny cyst in the right kidney of no clinical significance\par \par \par Date: 6/13/2008\par Study: CT AP ([facility])\par Results: 1) There is marked thickening and trabeculation of the wall of the urinary bladder. The etiology of this finding is not demonstrated with certainty by CT scanning, s the prostate is not significantly enlarged. This finding must be correlated clinically. Does the patient have a neurogenic bladder? There are a few tiny renal cysts. The kidneys are otherwise unremarkable\par 2) There is a small cyst identified within the head of the pancreas adjacent to the superior mesenteric vein. This is unchanged in comparison with the examination performed here in February 2008. A follow up examination of the pancreas, utilizing MRI with MRCP or repeated CT scan is suggested in approximately 6 months, to rule out any change in size and configuration of the cyst\par 3) There are a few tiny hepatic cysts. These are of doubtful clinical significance. The liver and spleen was unremarkable\par 4) There is atherosclerotic change s described\par 6) There are coarsened linear markings identified posteriorly and posteromedially at the right lung base. These are postinflammatory in nature\par 6) There is slight ectasia of the left external iliac vein\par \par Labs:\par Date: 8/12/15\par Results: CEA 3.6, CA 19-9; <1.0\par \par Date:\par Results:Date:\par Results:\par \par Pathology:\par \par Date:\par Results:\par \par

## 2022-07-11 NOTE — HISTORY OF PRESENT ILLNESS
[de-identified] : Patient Name: SHARON OROZCO \par MRN: [id number] \par Vee MRN:\par Referring Provider: Dr. Georgai Baeza\par Date: 07/11/2022 \par \par Diagnosis: Pancreas cyst\par \par 82 year male  presents for evaluation of pancreas cysts.\par Many years ago he was being worked up for hematuria and on imaging was found to have a pancreas cyst. He was then referred to Dr. Salazar and was advised to have follow imaging "every few years". He is here now to review most recent imaging and discuss plan of care.\par \par Currently, Mr. OROZCO denies abdominal pain and discomfort, denies having decreased appetite, and denies nausea or vomiting. He denies changes in bowel habits and denies recent unintentional weight loss. He denies fevers, chills, or night sweats.\par \par Functional Status: Mr. OROZCO is able to exercise without fatigue or dyspnea.

## 2022-07-28 ENCOUNTER — NON-APPOINTMENT (OUTPATIENT)
Age: 82
End: 2022-07-28

## 2022-08-12 ENCOUNTER — NON-APPOINTMENT (OUTPATIENT)
Age: 82
End: 2022-08-12

## 2022-08-14 ENCOUNTER — NON-APPOINTMENT (OUTPATIENT)
Age: 82
End: 2022-08-14

## 2022-09-01 ENCOUNTER — TRANSCRIPTION ENCOUNTER (OUTPATIENT)
Age: 82
End: 2022-09-01

## 2022-09-08 ENCOUNTER — APPOINTMENT (OUTPATIENT)
Dept: HEART AND VASCULAR | Facility: CLINIC | Age: 82
End: 2022-09-08

## 2022-09-08 VITALS — SYSTOLIC BLOOD PRESSURE: 138 MMHG | DIASTOLIC BLOOD PRESSURE: 82 MMHG | HEART RATE: 66 BPM

## 2022-09-08 DIAGNOSIS — R05.3 CHRONIC COUGH: ICD-10-CM

## 2022-09-08 DIAGNOSIS — R06.02 SHORTNESS OF BREATH: ICD-10-CM

## 2022-09-08 DIAGNOSIS — E78.2 MIXED HYPERLIPIDEMIA: ICD-10-CM

## 2022-09-08 DIAGNOSIS — D49.0 NEOPLASM OF UNSPECIFIED BEHAVIOR OF DIGESTIVE SYSTEM: ICD-10-CM

## 2022-09-08 PROCEDURE — 99214 OFFICE O/P EST MOD 30 MIN: CPT | Mod: 25

## 2022-09-08 PROCEDURE — 93015 CV STRESS TEST SUPVJ I&R: CPT

## 2022-09-08 PROCEDURE — 93306 TTE W/DOPPLER COMPLETE: CPT

## 2022-09-11 PROBLEM — R05.3 CHRONIC COUGH: Status: ACTIVE | Noted: 2017-08-23

## 2022-09-11 PROBLEM — E78.2 MIXED HYPERLIPIDEMIA: Status: ACTIVE | Noted: 2021-04-01

## 2022-09-11 PROBLEM — R06.02 SHORTNESS OF BREATH: Status: ACTIVE | Noted: 2021-04-01

## 2022-09-11 NOTE — REASON FOR VISIT
[Arrhythmia/ECG Abnorrmalities] : arrhythmia/ECG abnormalities [Structural Heart and Valve Disease] : structural heart and valve disease [Hyperlipidemia] : hyperlipidemia [FreeTextEntry1] : pt s/p tavr pt valve clotted and reclotted when coumadin stopped pt with levine

## 2022-09-11 NOTE — ASSESSMENT
[FreeTextEntry1] : Patient status post TAVR acceptable gradient INRs have been therapeutic patient has known coronary disease in the LAD patient has a pancreatic cyst that was followed from 2008 through 2016 it is a simple cyst of approximately 2 cm patient had small lung lesions that have resolved on the last CAT scan of April 2021 patient has hypertension and hyperlipidemia\par \par negative ett for ischemia\par \par stable gradient 23

## 2022-09-11 NOTE — PHYSICAL EXAM
[Murmur] : murmur [Normal] : no edema, no cyanosis, no clubbing, no varicosities [de-identified] : 2/6 rhea gee

## 2022-10-18 ENCOUNTER — NON-APPOINTMENT (OUTPATIENT)
Age: 82
End: 2022-10-18

## 2022-10-18 RX ORDER — FAMOTIDINE 20 MG/1
20 TABLET, FILM COATED ORAL DAILY
Qty: 30 | Refills: 1 | Status: COMPLETED | COMMUNITY
End: 2022-10-18

## 2023-01-13 ENCOUNTER — NON-APPOINTMENT (OUTPATIENT)
Age: 83
End: 2023-01-13

## 2023-01-24 ENCOUNTER — NON-APPOINTMENT (OUTPATIENT)
Age: 83
End: 2023-01-24

## 2023-02-08 ENCOUNTER — RX RENEWAL (OUTPATIENT)
Age: 83
End: 2023-02-08

## 2023-02-23 ENCOUNTER — APPOINTMENT (OUTPATIENT)
Dept: HEART AND VASCULAR | Facility: CLINIC | Age: 83
End: 2023-02-23
Payer: MEDICARE

## 2023-02-23 ENCOUNTER — NON-APPOINTMENT (OUTPATIENT)
Age: 83
End: 2023-02-23

## 2023-02-23 ENCOUNTER — LABORATORY RESULT (OUTPATIENT)
Age: 83
End: 2023-02-23

## 2023-02-23 VITALS
HEART RATE: 63 BPM | WEIGHT: 175 LBS | HEIGHT: 69 IN | DIASTOLIC BLOOD PRESSURE: 84 MMHG | BODY MASS INDEX: 25.92 KG/M2 | OXYGEN SATURATION: 98 % | SYSTOLIC BLOOD PRESSURE: 132 MMHG

## 2023-02-23 DIAGNOSIS — R68.83 CHILLS (WITHOUT FEVER): ICD-10-CM

## 2023-02-23 PROCEDURE — 99214 OFFICE O/P EST MOD 30 MIN: CPT | Mod: 25

## 2023-02-23 PROCEDURE — 93306 TTE W/DOPPLER COMPLETE: CPT

## 2023-02-23 PROCEDURE — 93000 ELECTROCARDIOGRAM COMPLETE: CPT

## 2023-02-23 PROCEDURE — 36415 COLL VENOUS BLD VENIPUNCTURE: CPT

## 2023-02-23 RX ORDER — HYDROCHLOROTHIAZIDE 25 MG/1
25 TABLET ORAL
Qty: 30 | Refills: 2 | Status: COMPLETED | COMMUNITY
End: 2023-02-23

## 2023-02-24 LAB
ALBUMIN SERPL ELPH-MCNC: 4.5 G/DL
ALP BLD-CCNC: 55 U/L
ALT SERPL-CCNC: 14 U/L
ANION GAP SERPL CALC-SCNC: 15 MMOL/L
AST SERPL-CCNC: 22 U/L
BASOPHILS # BLD AUTO: 0.06 K/UL
BASOPHILS NFR BLD AUTO: 0.8 %
BILIRUB SERPL-MCNC: 1 MG/DL
BUN SERPL-MCNC: 24 MG/DL
CALCIUM SERPL-MCNC: 9.4 MG/DL
CHLORIDE SERPL-SCNC: 102 MMOL/L
CHOLEST SERPL-MCNC: 184 MG/DL
CO2 SERPL-SCNC: 22 MMOL/L
CREAT SERPL-MCNC: 1.43 MG/DL
CRP SERPL-MCNC: <3 MG/L
EGFR: 49 ML/MIN/1.73M2
EOSINOPHIL # BLD AUTO: 0.03 K/UL
EOSINOPHIL NFR BLD AUTO: 0.4 %
ERYTHROCYTE [SEDIMENTATION RATE] IN BLOOD BY WESTERGREN METHOD: 22 MM/HR
ESTIMATED AVERAGE GLUCOSE: 117 MG/DL
GLUCOSE SERPL-MCNC: 107 MG/DL
HBA1C MFR BLD HPLC: 5.7 %
HCT VFR BLD CALC: 48.4 %
HDLC SERPL-MCNC: 70 MG/DL
HGB BLD-MCNC: 15.5 G/DL
IMM GRANULOCYTES NFR BLD AUTO: 0.1 %
INR PPP: 3.55 RATIO
LDLC SERPL CALC-MCNC: 95 MG/DL
LYMPHOCYTES # BLD AUTO: 1.23 K/UL
LYMPHOCYTES NFR BLD AUTO: 17.4 %
MAN DIFF?: NORMAL
MCHC RBC-ENTMCNC: 29.9 PG
MCHC RBC-ENTMCNC: 32 GM/DL
MCV RBC AUTO: 93.3 FL
MONOCYTES # BLD AUTO: 0.47 K/UL
MONOCYTES NFR BLD AUTO: 6.6 %
NEUTROPHILS # BLD AUTO: 5.27 K/UL
NEUTROPHILS NFR BLD AUTO: 74.7 %
NONHDLC SERPL-MCNC: 114 MG/DL
PLATELET # BLD AUTO: 186 K/UL
POTASSIUM SERPL-SCNC: 5 MMOL/L
PROT SERPL-MCNC: 6.8 G/DL
PSA FREE FLD-MCNC: 24 %
PSA FREE SERPL-MCNC: 0.23 NG/ML
PSA SERPL-MCNC: 0.96 NG/ML
PT BLD: 41.7 SEC
RBC # BLD: 5.19 M/UL
RBC # FLD: 12.7 %
SODIUM SERPL-SCNC: 139 MMOL/L
T4 FREE SERPL-MCNC: 1.2 NG/DL
TRIGL SERPL-MCNC: 93 MG/DL
TSH SERPL-ACNC: 4.78 UIU/ML
VIT B12 SERPL-MCNC: 301 PG/ML
WBC # FLD AUTO: 7.07 K/UL

## 2023-02-24 NOTE — HISTORY OF PRESENT ILLNESS
[FreeTextEntry1] : 82 year old male with PMHX of AS sp TAVR (2019, complicated by clotting when off AC) HTN, HLD here for follow up and echocardiogram. \par \par Since last visit, INR noted at 4.0 with goal 2.0-3.0. No change in diet / medication. \par \par He has no chest pain. He reports getting out of breath with rushing to get to his breath. Otherwise can walk without complication. He recently went to [place] and was able to swim a lap before getting tired. \par \par He reports no edema or orthopnea. \par \par He adds having chills with no fever. No cough, abdominal pains, GI or  complaints. He did a covid test recently and negative. \par \par He has chronic feeling of unsteady. He feels this has improved. He has not had any recent falls or syncope. He has occasional paresthesia of left lip and left eye lid. He reports no palpitations.

## 2023-02-24 NOTE — REVIEW OF SYSTEMS
[Dyspnea on exertion] : dyspnea during exertion [Dizziness] : dizziness [Fever] : no fever [Chills] : no chills [SOB] : no shortness of breath [Chest Discomfort] : no chest discomfort [Lower Ext Edema] : no extremity edema [Leg Claudication] : no intermittent leg claudication [Palpitations] : no palpitations [Orthopnea] : no orthopnea [PND] : no PND [Nausea] : no nausea [Vomiting] : no vomiting [Diarrhea] : diarrhea [Numbness (Hypoesthesia)] : no numbness [Weakness] : no weakness [Speech Disturbance] : no speech disturbance [Easy Bleeding] : no tendency for easy bleeding

## 2023-02-24 NOTE — DISCUSSION/SUMMARY
[FreeTextEntry1] : 82 year old male with PMHX of AS sp TAVR (2019, complicated by clotting when off AC) HTN, HLD here for follow up and echocardiogram. \par \par CVD Risk: NSR 61 RBBB, no change. Has some dyspnea on exertion. Last Stress EKG normal ( 9/2022 ) 7:14 minutes with 10.1 METS. significant CAD not likely. COntinue with risk factor optimization.\par AS sp TAVR: Mild worsening of dyspnea.  Echocardiogram done today revealing transaortic valve gradient at 27.48. Will send for BNP and check H/H\par HTN: Controlled. Continue with Enalapril 5mg, HTCZ 25\par HLD: LDL goal < 100. Continue with Crestor 40mg\par CHILLs: Given history of TAVR, blood cultures sent to rule out infection. \par \par Follow up in 4 months. \par I, Dr. Baeza, personally performed the evaluation and management (E/M) services for this established patient who presents today with (a) new problem(s)/exacerbation of (an) existing condition(s). That E/M includes conducting the clinically appropriate interval history &/or exam, assessing all new/exacerbated conditions, and establishing a new plan of care. Today, my ACP, Ling Rodriguez, was here to observe &/or participate in the visit & follow plan of care established by me.

## 2023-03-17 ENCOUNTER — NON-APPOINTMENT (OUTPATIENT)
Age: 83
End: 2023-03-17

## 2023-03-29 ENCOUNTER — RX RENEWAL (OUTPATIENT)
Age: 83
End: 2023-03-29

## 2023-04-26 ENCOUNTER — APPOINTMENT (OUTPATIENT)
Dept: PHARMACY | Facility: CLINIC | Age: 83
End: 2023-04-26

## 2023-05-09 ENCOUNTER — RX RENEWAL (OUTPATIENT)
Age: 83
End: 2023-05-09

## 2023-05-09 DIAGNOSIS — Z86.39 PERSONAL HISTORY OF OTHER ENDOCRINE, NUTRITIONAL AND METABOLIC DISEASE: ICD-10-CM

## 2023-05-16 ENCOUNTER — NON-APPOINTMENT (OUTPATIENT)
Age: 83
End: 2023-05-16

## 2023-05-18 ENCOUNTER — APPOINTMENT (OUTPATIENT)
Dept: HEART AND VASCULAR | Facility: CLINIC | Age: 83
End: 2023-05-18
Payer: MEDICARE

## 2023-05-18 VITALS
SYSTOLIC BLOOD PRESSURE: 113 MMHG | HEART RATE: 66 BPM | HEIGHT: 69 IN | WEIGHT: 175 LBS | BODY MASS INDEX: 25.92 KG/M2 | OXYGEN SATURATION: 96 % | DIASTOLIC BLOOD PRESSURE: 75 MMHG | TEMPERATURE: 97.8 F

## 2023-05-18 PROCEDURE — 99214 OFFICE O/P EST MOD 30 MIN: CPT

## 2023-05-19 ENCOUNTER — APPOINTMENT (OUTPATIENT)
Dept: OTOLARYNGOLOGY | Facility: CLINIC | Age: 83
End: 2023-05-19
Payer: SELF-PAY

## 2023-05-19 PROCEDURE — V5011: CPT

## 2023-05-20 NOTE — HISTORY OF PRESENT ILLNESS
[FreeTextEntry1] : 82 year old male with PMHX of AS sp TAVR (2019, complicated by clotting when off AC) HTN, HLD here for to discuss knee surgery\par \par Since last visit, INR noted at 2.4 with goal 2.0-3.0. No change in diet / medication. \par \par He has no chest pain. He reports getting out of breath with rushing to get to his breath. Otherwise can walk without complication.  \par \par He reports no edema or orthopnea. \par \par He adds having chills with no fever. No cough, abdominal pains, GI or  complaints. He did a covid test recently and negative. Blood cultures negative no futhur hx of chills or fever\par \par He has chronic feeling of unsteady. He feels this has improved. He has not had any recent falls or syncope. He has occasional paresthesia of left lip and left eye lid. He reports no palpitations.

## 2023-05-20 NOTE — REVIEW OF SYSTEMS
[Fever] : no fever [Chills] : no chills [SOB] : no shortness of breath [Dyspnea on exertion] : dyspnea during exertion [Chest Discomfort] : no chest discomfort [Lower Ext Edema] : no extremity edema [Leg Claudication] : no intermittent leg claudication [Palpitations] : no palpitations [Orthopnea] : no orthopnea [PND] : no PND [Nausea] : no nausea [Vomiting] : no vomiting [Diarrhea] : diarrhea [Dizziness] : dizziness [Numbness (Hypoesthesia)] : no numbness [Weakness] : no weakness [Easy Bleeding] : no tendency for easy bleeding [Speech Disturbance] : no speech disturbance

## 2023-05-20 NOTE — DISCUSSION/SUMMARY
[FreeTextEntry1] : 82 year old male with PMHX of AS sp TAVR (2019, complicated by clotting when off AC) HTN, HLD here for follow up and echocardiogram. \par \par CVD Risk: NSR 61 RBBB, no change. Has some dyspnea on exertion. Last Stress EKG normal ( 9/2022 ) 7:14 minutes with 10.1 METS. significant CAD not likely. COntinue with risk factor optimization.\par AS sp TAVR: Mild worsening of dyspnea.  Echocardiogram  transaortic valve gradient at 27.48\par HTN: Controlled. Continue with Enalapril 5mg, HTCZ 25\par HLD: LDL goal < 100. Continue with Crestor 40mg\par pt clotted off his valve in one month in 2021am reluctant to clear for elective proceedures where he has to stop coumadin\par \par

## 2023-05-20 NOTE — PHYSICAL EXAM
[Murmur] : murmur [Normal] : no edema, no cyanosis, no clubbing, no varicosities [de-identified] : 2/6 rhea gee

## 2023-06-13 ENCOUNTER — NON-APPOINTMENT (OUTPATIENT)
Age: 83
End: 2023-06-13

## 2023-06-29 ENCOUNTER — APPOINTMENT (OUTPATIENT)
Dept: PHARMACY | Facility: CLINIC | Age: 83
End: 2023-06-29
Payer: SELF-PAY

## 2023-06-29 PROCEDURE — V5267C: CUSTOM

## 2023-07-16 ENCOUNTER — RX RENEWAL (OUTPATIENT)
Age: 83
End: 2023-07-16

## 2023-08-11 ENCOUNTER — RX RENEWAL (OUTPATIENT)
Age: 83
End: 2023-08-11

## 2023-09-19 ENCOUNTER — NON-APPOINTMENT (OUTPATIENT)
Age: 83
End: 2023-09-19

## 2023-11-01 DIAGNOSIS — R07.9 CHEST PAIN, UNSPECIFIED: ICD-10-CM

## 2023-11-01 DIAGNOSIS — R42 DIZZINESS AND GIDDINESS: ICD-10-CM

## 2023-11-01 DIAGNOSIS — I25.10 ATHEROSCLEROTIC HEART DISEASE OF NATIVE CORONARY ARTERY W/OUT ANGINA PECTORIS: ICD-10-CM

## 2023-11-02 ENCOUNTER — RX RENEWAL (OUTPATIENT)
Age: 83
End: 2023-11-02

## 2023-11-02 RX ORDER — ENALAPRIL MALEATE 5 MG/1
5 TABLET ORAL DAILY
Qty: 90 | Refills: 0 | Status: ACTIVE | COMMUNITY
Start: 2023-05-09 | End: 1900-01-01

## 2023-11-08 ENCOUNTER — APPOINTMENT (OUTPATIENT)
Dept: HEART AND VASCULAR | Facility: CLINIC | Age: 83
End: 2023-11-08
Payer: MEDICARE

## 2023-11-08 VITALS
DIASTOLIC BLOOD PRESSURE: 84 MMHG | WEIGHT: 171 LBS | BODY MASS INDEX: 25.33 KG/M2 | HEIGHT: 69 IN | OXYGEN SATURATION: 98 % | TEMPERATURE: 98.2 F | SYSTOLIC BLOOD PRESSURE: 124 MMHG | HEART RATE: 74 BPM

## 2023-11-08 DIAGNOSIS — E78.00 PURE HYPERCHOLESTEROLEMIA, UNSPECIFIED: ICD-10-CM

## 2023-11-08 DIAGNOSIS — I35.0 NONRHEUMATIC AORTIC (VALVE) STENOSIS: ICD-10-CM

## 2023-11-08 DIAGNOSIS — I10 ESSENTIAL (PRIMARY) HYPERTENSION: ICD-10-CM

## 2023-11-08 DIAGNOSIS — I71.9 AORTIC ANEURYSM OF UNSPECIFIED SITE, W/OUT RUPTURE: ICD-10-CM

## 2023-11-08 PROCEDURE — 99214 OFFICE O/P EST MOD 30 MIN: CPT

## 2023-11-08 PROCEDURE — 93306 TTE W/DOPPLER COMPLETE: CPT

## 2023-11-08 PROCEDURE — 93000 ELECTROCARDIOGRAM COMPLETE: CPT

## 2024-03-01 ENCOUNTER — RX RENEWAL (OUTPATIENT)
Age: 84
End: 2024-03-01

## 2024-03-01 RX ORDER — HYDROCHLOROTHIAZIDE 25 MG/1
25 TABLET ORAL DAILY
Qty: 90 | Refills: 3 | Status: ACTIVE | COMMUNITY
Start: 2022-02-24 | End: 1900-01-01

## 2024-03-26 ENCOUNTER — RX RENEWAL (OUTPATIENT)
Age: 84
End: 2024-03-26

## 2024-03-26 RX ORDER — FAMOTIDINE 20 MG/1
20 TABLET, FILM COATED ORAL
Qty: 30 | Refills: 2 | Status: ACTIVE | COMMUNITY
Start: 2022-10-18 | End: 1900-01-01

## 2024-03-28 RX ORDER — ROSUVASTATIN CALCIUM 40 MG/1
40 TABLET, FILM COATED ORAL
Qty: 7 | Refills: 0 | Status: ACTIVE | COMMUNITY
Start: 2023-03-29 | End: 1900-01-01

## 2024-03-28 RX ORDER — WARFARIN SODIUM 2.5 MG/1
2.5 TABLET ORAL
Qty: 7 | Refills: 0 | Status: ACTIVE | COMMUNITY
Start: 2023-02-08 | End: 1900-01-01

## 2024-04-15 ENCOUNTER — NON-APPOINTMENT (OUTPATIENT)
Age: 84
End: 2024-04-15

## 2024-04-15 DIAGNOSIS — Z79.01 LONG TERM (CURRENT) USE OF ANTICOAGULANTS: ICD-10-CM

## 2024-04-19 ENCOUNTER — NON-APPOINTMENT (OUTPATIENT)
Age: 84
End: 2024-04-19

## 2024-04-24 ENCOUNTER — APPOINTMENT (OUTPATIENT)
Dept: HEART AND VASCULAR | Facility: CLINIC | Age: 84
End: 2024-04-24
Payer: MEDICARE

## 2024-04-24 VITALS
BODY MASS INDEX: 25.18 KG/M2 | HEART RATE: 76 BPM | HEIGHT: 69 IN | WEIGHT: 170 LBS | OXYGEN SATURATION: 96 % | SYSTOLIC BLOOD PRESSURE: 135 MMHG | DIASTOLIC BLOOD PRESSURE: 71 MMHG

## 2024-04-24 PROCEDURE — 99214 OFFICE O/P EST MOD 30 MIN: CPT

## 2024-04-24 PROCEDURE — 93306 TTE W/DOPPLER COMPLETE: CPT

## 2024-04-24 PROCEDURE — 93000 ELECTROCARDIOGRAM COMPLETE: CPT

## 2024-04-24 NOTE — REASON FOR VISIT
[FreeTextEntry1] : Pt. is an 84-year-old M with PMHx of HTN, HLD, GERD, AS s/p TAVR (04/2019 at [facility] - complicated by leaflet thrombus) who presents today for follow-up.

## 2024-04-24 NOTE — HISTORY OF PRESENT ILLNESS
[FreeTextEntry1] : Since his last visit,   Pt. denies any chest pain, dyspnea, orthopnea, PND, palpitations, lightheadedness, syncope or lower extremity edema.   ================================ Labs/Diagnostics:  INR (2024): INR 3.3  2023 A1c: 5.7% Lipid profile: T, TC: 184, HDL: 70, LDL: 95 K/Creat: 5/.43  Echo (2023):  CONCLUSIONS: 1. Normal right ventricular cavity size, wall thickness, and systolic function. 2. Mild mitral regurgitation. 3. Normal atria. 4. Mild to moderate aortic stenosis.  Echo (2023): normal LVSF with EF 55-60%, bioprosthetic valve replacement in aortic position with no valvular regurgitation (PG = 29.9, MG = 15.9)  Stress EKG (2022): 7:14 mins; 10.10 METS; no ischemic ST EKG changes.

## 2024-04-24 NOTE — ASSESSMENT
[FreeTextEntry1] : Pt. is an 84-year-old M with PMHx of HTN, HLD, GERD, AS s/p TAVR (04/2019 at [facility] - complicated by leaflet thrombus) who presents today for follow-up. rbbb no chnagr  s/p avr gradient 25 s/p epidural and ablation

## 2024-04-24 NOTE — PHYSICAL EXAM
[Normal Conjunctiva] : normal conjunctiva [Normal Venous Pressure] : normal venous pressure [Normal S1, S2] : normal S1, S2 [Normal Gait] : normal gait [No Edema] : no edema [Normal] : alert and oriented, normal memory [de-identified] : 2/6 systolic murmur

## 2024-05-21 ENCOUNTER — NON-APPOINTMENT (OUTPATIENT)
Age: 84
End: 2024-05-21

## 2024-05-28 ENCOUNTER — APPOINTMENT (OUTPATIENT)
Dept: SURGICAL ONCOLOGY | Facility: CLINIC | Age: 84
End: 2024-05-28
Payer: MEDICARE

## 2024-05-28 DIAGNOSIS — K86.2 CYST OF PANCREAS: ICD-10-CM

## 2024-05-28 PROCEDURE — 99214 OFFICE O/P EST MOD 30 MIN: CPT

## 2024-05-28 NOTE — RESULTS/DATA
[FreeTextEntry1] : IPMN Kyoto criteria (2023)   High Risk Stigmata   Obstructive jaundice                                           no Enhancing mural nodule 5 mm or greater          no Main PD  > 10mm                                               no Abnormal cytology                                           not done   Worrisome features   Acute pancreatitis                                               no New onset/exacerbation DM in last year             no Cyst size > 3 cm                                                  no Enhancing mural nodule < 5 mm                        no Thick/enhancing cyst wall                                   no Main PD 5 to 9 mm                                              no Change in PD caliber w distal atrophy               no Lymphadenopathy                                             no Elevated Ca 19-9                                             pending Cyst growth rate >/= 2.5 mm/yr                        yes

## 2024-05-28 NOTE — ASSESSMENT
[FreeTextEntry1] : Impression) pancreas cyst  Plan) reviewed clinical information with the patient as well as the imaging studies.  Has a cyst that has enlarged from around 2 to 2.7 cm based on my own measurements in the last 2 years.  There are no high risk features there is at least 1 worrisome feature that of growth rate in the CA 19-9 is pending.  Will review at benign pancreas conference but I think reasonable to repeat his imaging in 6 months before considering an EUS especially given the fact that he is 84 and is on Eliquis for a clot on his heart valve which according to the patient he cannot stop Eliquis.  We discussed that everything in medicine is a risk versus benefits and I believe that right now the risks of stopping Eliquis for invasive procedure higher than the benefits should this equation change however we will reevaluate.  Will be in touch after reviewing at benign pancreas in the next few weeks.  I will also speak with Dr. Santana he his cardiologist.  All questions answered.  Everton Hernandes MD  Chief Surgical Oncology Multidisciplinary GI cancer program [facility]  Professor Surgery [facility]   CC Dr. Baeza  Time spent reviewing imaging and in consultation 35 minutes

## 2024-05-28 NOTE — HISTORY OF PRESENT ILLNESS
[Home] : at home, [unfilled] , at the time of the visit. [Medical Office: ([facility])___] : at the medical office located in  [Verbal consent obtained from patient] : the patient, [unfilled] [de-identified] : Patient: Gary Oliva MRN: [id number] Referring Provider: Dr. Georgia Baeza Date of Visit: 5/28/24  Diagnosis: Pancreas Cyst  84 year male presents for a follow up to review his most recent imaging of his pancreas cyst. Previously had no high risk or worrisome features. Last imaged in 2022 and appeared the same size, however, on most recent MRI it is noted to be enlarging,   Currently, Mr. OLIVA doing well. Patient denies changes in bowel habits, appetite changes, abdominal pain, nausea, vomiting, unintentional weight loss, fevers or chills.  Functional Status: Mr. OLIVA is able to exercise without fatigue or dyspnea.  Work Up: 6/24/22 - MRCP 2.0 unilocular cyst in the pancreatic uncinate process, previously 1.7cm in 2017. normal caliber pancreatic duct. additional sub centimeter pancreatic cysts.  5/17/24 - MRI ([facility]) showed an enlarging 2.9 cm cystic lesion of the pancreatic uncinate process. Considerations include pancreatic cystic neoplasm or side branch IPMN. Multiple additional pancreatic cystic lesions, some slightly larger.

## 2024-06-16 ENCOUNTER — RX RENEWAL (OUTPATIENT)
Age: 84
End: 2024-06-16

## 2024-06-16 RX ORDER — WARFARIN 2.5 MG/1
2.5 TABLET ORAL
Qty: 90 | Refills: 3 | Status: ACTIVE | COMMUNITY
Start: 2021-05-28 | End: 1900-01-01

## 2024-08-13 ENCOUNTER — RX RENEWAL (OUTPATIENT)
Age: 84
End: 2024-08-13

## 2024-08-16 ENCOUNTER — TRANSCRIPTION ENCOUNTER (OUTPATIENT)
Age: 84
End: 2024-08-16

## 2024-08-20 DIAGNOSIS — K86.2 CYST OF PANCREAS: ICD-10-CM

## 2024-08-28 ENCOUNTER — NON-APPOINTMENT (OUTPATIENT)
Age: 84
End: 2024-08-28

## 2024-10-21 DIAGNOSIS — Z79.01 LONG TERM (CURRENT) USE OF ANTICOAGULANTS: ICD-10-CM

## 2024-11-07 ENCOUNTER — APPOINTMENT (OUTPATIENT)
Dept: HEART AND VASCULAR | Facility: CLINIC | Age: 84
End: 2024-11-07
Payer: MEDICARE

## 2024-11-07 VITALS
HEIGHT: 69 IN | DIASTOLIC BLOOD PRESSURE: 88 MMHG | WEIGHT: 175 LBS | OXYGEN SATURATION: 95 % | BODY MASS INDEX: 25.92 KG/M2 | HEART RATE: 69 BPM | SYSTOLIC BLOOD PRESSURE: 141 MMHG

## 2024-11-07 VITALS — SYSTOLIC BLOOD PRESSURE: 138 MMHG | DIASTOLIC BLOOD PRESSURE: 79 MMHG

## 2024-11-07 PROCEDURE — ZZZZZ: CPT

## 2024-11-07 PROCEDURE — 99214 OFFICE O/P EST MOD 30 MIN: CPT

## 2024-11-07 PROCEDURE — 93306 TTE W/DOPPLER COMPLETE: CPT

## 2024-11-07 PROCEDURE — 93000 ELECTROCARDIOGRAM COMPLETE: CPT

## 2024-11-07 RX ADMIN — CYANOCOBALAMIN 0 MCG/ML: 1000 INJECTION INTRAMUSCULAR; SUBCUTANEOUS at 00:00

## 2024-11-14 ENCOUNTER — NON-APPOINTMENT (OUTPATIENT)
Age: 84
End: 2024-11-14

## 2024-11-14 DIAGNOSIS — I35.0 NONRHEUMATIC AORTIC (VALVE) STENOSIS: ICD-10-CM

## 2024-11-15 ENCOUNTER — NON-APPOINTMENT (OUTPATIENT)
Age: 84
End: 2024-11-15

## 2024-11-18 ENCOUNTER — APPOINTMENT (OUTPATIENT)
Dept: SURGICAL ONCOLOGY | Facility: CLINIC | Age: 84
End: 2024-11-18
Payer: MEDICARE

## 2024-11-18 DIAGNOSIS — D49.0 NEOPLASM OF UNSPECIFIED BEHAVIOR OF DIGESTIVE SYSTEM: ICD-10-CM

## 2024-11-18 PROCEDURE — 99213 OFFICE O/P EST LOW 20 MIN: CPT

## 2025-01-06 ENCOUNTER — APPOINTMENT (OUTPATIENT)
Dept: PULMONOLOGY | Facility: CLINIC | Age: 85
End: 2025-01-06
Payer: MEDICARE

## 2025-01-06 VITALS
HEART RATE: 79 BPM | DIASTOLIC BLOOD PRESSURE: 89 MMHG | BODY MASS INDEX: 26.67 KG/M2 | TEMPERATURE: 97.6 F | OXYGEN SATURATION: 97 % | SYSTOLIC BLOOD PRESSURE: 146 MMHG | WEIGHT: 180.6 LBS

## 2025-01-06 PROCEDURE — 99203 OFFICE O/P NEW LOW 30 MIN: CPT | Mod: 25

## 2025-01-06 PROCEDURE — 71046 X-RAY EXAM CHEST 2 VIEWS: CPT

## 2025-01-21 ENCOUNTER — NON-APPOINTMENT (OUTPATIENT)
Age: 85
End: 2025-01-21

## 2025-02-06 ENCOUNTER — RX RENEWAL (OUTPATIENT)
Age: 85
End: 2025-02-06

## 2025-02-21 ENCOUNTER — NON-APPOINTMENT (OUTPATIENT)
Age: 85
End: 2025-02-21

## 2025-04-11 ENCOUNTER — NON-APPOINTMENT (OUTPATIENT)
Age: 85
End: 2025-04-11

## 2025-05-08 DIAGNOSIS — Z79.01 LONG TERM (CURRENT) USE OF ANTICOAGULANTS: ICD-10-CM

## 2025-06-07 ENCOUNTER — RX RENEWAL (OUTPATIENT)
Age: 85
End: 2025-06-07

## 2025-07-23 ENCOUNTER — APPOINTMENT (OUTPATIENT)
Dept: HEART AND VASCULAR | Facility: CLINIC | Age: 85
End: 2025-07-23
Payer: MEDICARE

## 2025-07-23 ENCOUNTER — NON-APPOINTMENT (OUTPATIENT)
Age: 85
End: 2025-07-23

## 2025-07-23 VITALS
HEIGHT: 68 IN | RESPIRATION RATE: 17 BRPM | BODY MASS INDEX: 26.67 KG/M2 | SYSTOLIC BLOOD PRESSURE: 134 MMHG | DIASTOLIC BLOOD PRESSURE: 79 MMHG | WEIGHT: 176 LBS | HEART RATE: 52 BPM | TEMPERATURE: 97.6 F | OXYGEN SATURATION: 97 %

## 2025-07-23 DIAGNOSIS — I35.0 NONRHEUMATIC AORTIC (VALVE) STENOSIS: ICD-10-CM

## 2025-07-23 DIAGNOSIS — I71.9 AORTIC ANEURYSM OF UNSPECIFIED SITE, W/OUT RUPTURE: ICD-10-CM

## 2025-07-23 PROCEDURE — 93306 TTE W/DOPPLER COMPLETE: CPT

## 2025-07-23 PROCEDURE — 36415 COLL VENOUS BLD VENIPUNCTURE: CPT

## 2025-07-23 PROCEDURE — G2211 COMPLEX E/M VISIT ADD ON: CPT

## 2025-07-23 PROCEDURE — 99214 OFFICE O/P EST MOD 30 MIN: CPT

## 2025-07-23 PROCEDURE — 93000 ELECTROCARDIOGRAM COMPLETE: CPT

## 2025-07-25 LAB
ALBUMIN SERPL ELPH-MCNC: 4 G/DL
ALP BLD-CCNC: 57 U/L
ALT SERPL-CCNC: 17 U/L
ANION GAP SERPL CALC-SCNC: 13 MMOL/L
AST SERPL-CCNC: 25 U/L
BASOPHILS # BLD AUTO: 0.02 K/UL
BASOPHILS NFR BLD AUTO: 0.4 %
BILIRUB SERPL-MCNC: 0.8 MG/DL
BUN SERPL-MCNC: 25 MG/DL
CALCIUM SERPL-MCNC: 9.3 MG/DL
CHLORIDE SERPL-SCNC: 107 MMOL/L
CHOLEST SERPL-MCNC: 157 MG/DL
CK SERPL-CCNC: 100 U/L
CO2 SERPL-SCNC: 22 MMOL/L
CREAT SERPL-MCNC: 1.38 MG/DL
EGFRCR SERPLBLD CKD-EPI 2021: 50 ML/MIN/1.73M2
EOSINOPHIL # BLD AUTO: 0.05 K/UL
EOSINOPHIL NFR BLD AUTO: 0.9 %
ESTIMATED AVERAGE GLUCOSE: 126 MG/DL
FERRITIN SERPL-MCNC: 230 NG/ML
GLUCOSE SERPL-MCNC: 107 MG/DL
HBA1C MFR BLD HPLC: 6 %
HCT VFR BLD CALC: 41 %
HDLC SERPL-MCNC: 51 MG/DL
HGB BLD-MCNC: 13.9 G/DL
IMM GRANULOCYTES NFR BLD AUTO: 0.2 %
INR PPP: 3.88 RATIO
LDLC SERPL-MCNC: 88 MG/DL
LYMPHOCYTES # BLD AUTO: 1.47 K/UL
LYMPHOCYTES NFR BLD AUTO: 26.6 %
MAN DIFF?: NORMAL
MCHC RBC-ENTMCNC: 29.5 PG
MCHC RBC-ENTMCNC: 33.9 G/DL
MCV RBC AUTO: 87 FL
MONOCYTES # BLD AUTO: 0.54 K/UL
MONOCYTES NFR BLD AUTO: 9.8 %
NEUTROPHILS # BLD AUTO: 3.43 K/UL
NEUTROPHILS NFR BLD AUTO: 62.1 %
NONHDLC SERPL-MCNC: 106 MG/DL
PLATELET # BLD AUTO: 112 K/UL
POTASSIUM SERPL-SCNC: 4.4 MMOL/L
PROT SERPL-MCNC: 6.4 G/DL
PSA SERPL-MCNC: 0.77 NG/ML
PT BLD: 45.5 SEC
RBC # BLD: 4.71 M/UL
RBC # FLD: 13.2 %
SODIUM SERPL-SCNC: 143 MMOL/L
TRIGL SERPL-MCNC: 97 MG/DL
TSH SERPL-ACNC: 3.95 UIU/ML
VIT B12 SERPL-MCNC: 272 PG/ML
WBC # FLD AUTO: 5.52 K/UL

## 2025-08-04 ENCOUNTER — RX RENEWAL (OUTPATIENT)
Age: 85
End: 2025-08-04

## 2025-08-05 ENCOUNTER — NON-APPOINTMENT (OUTPATIENT)
Age: 85
End: 2025-08-05